# Patient Record
Sex: FEMALE | Race: WHITE | Employment: OTHER | URBAN - NONMETROPOLITAN AREA
[De-identification: names, ages, dates, MRNs, and addresses within clinical notes are randomized per-mention and may not be internally consistent; named-entity substitution may affect disease eponyms.]

---

## 2019-02-26 PROBLEM — M54.50 CHRONIC BILATERAL LOW BACK PAIN WITHOUT SCIATICA: Status: ACTIVE | Noted: 2019-02-26

## 2019-02-26 PROBLEM — G89.29 CHRONIC BILATERAL LOW BACK PAIN WITHOUT SCIATICA: Status: ACTIVE | Noted: 2019-02-26

## 2019-02-26 PROBLEM — M47.819 FACET HYPERTROPHY: Status: ACTIVE | Noted: 2019-02-26

## 2021-06-22 ENCOUNTER — TELEPHONE (OUTPATIENT)
Dept: PAIN MANAGEMENT | Age: 86
End: 2021-06-22

## 2021-12-20 ENCOUNTER — TELEPHONE (OUTPATIENT)
Dept: PAIN MANAGEMENT | Age: 86
End: 2021-12-20

## 2022-02-07 PROBLEM — M53.3 SACROILIAC JOINT DYSFUNCTION OF RIGHT SIDE: Status: ACTIVE | Noted: 2022-02-07

## 2022-08-23 PROBLEM — M54.2 CERVICALGIA: Status: ACTIVE | Noted: 2022-08-23

## 2023-04-26 ENCOUNTER — HOSPITAL ENCOUNTER (OUTPATIENT)
Dept: PAIN MANAGEMENT | Age: 88
Discharge: HOME | End: 2023-04-26
Payer: MEDICARE

## 2023-04-26 VITALS
DIASTOLIC BLOOD PRESSURE: 83 MMHG | WEIGHT: 142.04 LBS | OXYGEN SATURATION: 93 % | RESPIRATION RATE: 18 BRPM | SYSTOLIC BLOOD PRESSURE: 131 MMHG | TEMPERATURE: 97.1 F | HEART RATE: 54 BPM | BODY MASS INDEX: 24.25 KG/M2 | HEIGHT: 64 IN

## 2023-04-26 DIAGNOSIS — M47.816 FACET HYPERTROPHY OF LUMBAR REGION: ICD-10-CM

## 2023-04-26 PROCEDURE — 99213 OFFICE O/P EST LOW 20 MIN: CPT

## 2023-04-26 PROCEDURE — 99213 OFFICE O/P EST LOW 20 MIN: CPT | Performed by: NURSE PRACTITIONER

## 2023-04-26 RX ORDER — HYDROCODONE BITARTRATE AND ACETAMINOPHEN 7.5; 325 MG/1; MG/1
1 TABLET ORAL EVERY 6 HOURS PRN
Qty: 120 TABLET | Refills: 0 | Status: SHIPPED | OUTPATIENT
Start: 2023-04-26 | End: 2023-05-26

## 2023-04-26 ASSESSMENT — PAIN - FUNCTIONAL ASSESSMENT: PAIN_FUNCTIONAL_ASSESSMENT: PREVENTS OR INTERFERES SOME ACTIVE ACTIVITIES AND ADLS

## 2023-04-26 ASSESSMENT — PAIN DESCRIPTION - ORIENTATION: ORIENTATION: LOWER

## 2023-04-26 ASSESSMENT — PAIN SCALES - GENERAL: PAINLEVEL_OUTOF10: 4

## 2023-04-26 ASSESSMENT — PAIN DESCRIPTION - PROGRESSION: CLINICAL_PROGRESSION: GRADUALLY IMPROVING

## 2023-04-26 ASSESSMENT — PAIN DESCRIPTION - PAIN TYPE: TYPE: CHRONIC PAIN

## 2023-04-26 ASSESSMENT — PAIN DESCRIPTION - LOCATION: LOCATION: BACK

## 2023-04-26 ASSESSMENT — PAIN DESCRIPTION - DESCRIPTORS: DESCRIPTORS: ACHING;SHARP

## 2023-04-26 NOTE — PROGRESS NOTES
Clinic Documentation      Education Provided:  [x] Review of Ron Risk  [] Agreement Review  [x] PEG Score Calculated [] PHQ Score Calculated [] ORT Score Calculated    [] Compliance Issues Discussed [] Cognitive Behavior Needs [x] Exercise [] Review of Test [] Financial Issues  [x] Tobacco/Alcohol Use Reviewed [x] Teaching [] New Patient [] Picture Obtained    Physician Plan:  [] Outgoing Referral  [] Pharmacy Consult  [] Test Ordered [x] Prescription Ordered/Changed   [] Obtained Test Results / Consult Notes        Complete if patient is withholding blood thinner for procedure     Blood Thinner Patient is currently taking:      [] Plavix (Hold for 7 days)  [x] Aspirin (Hold for 5 days)     [] Pletal (Hold for 2 days)  [] Pradaxa (Hold for 3 days)    [] Effient (Hold for 7 days)  [] Xarelto (Hold for 2 days)    [] Eliquis (Hold for 2 days)  [] Brilinta (Hold for 7 days)    [] Coumadin (Hold for 5 days) - (INR needs to be drawn the day prior to procedure- INR < 2.0)    [] Aggrenox (Hold for 7 days)        [] Patient will stop medication on their own.    [] Blood Thinner Form Faxed for approval to hold.    Provider form faxed to:     Assessment Completed by:  Electronically signed by Darya Mack RN on 4/26/2023 at 1:44 PM

## 2023-04-26 NOTE — PROGRESS NOTES
[facility] Physical and Pain Medicine    Office Progress Note    Patient Name: Justin Mendoza    MR #: [id number]    Account #: [de-identified]    : 1935    Age: 81 y.o. Sex: female    Date: 2023    PCP: Luma Iniguez         Referring Provider:    Chief Complaint:   Chief Complaint   Patient presents with    Back Pain       History of Present Illness:    Justin Mendoza is a 81 y.o. female who presents to the office for follow-up of bilateral lumbar facet injections levels L3-L4, L4-L5. She says that she obtained 80% relief for 6 weeks. She is wanting the injections repeated. She continues to take her Norco and does obtain some relief between injections which allows her to complete ADL's. She holds her ASA on her own. Knows to hold 5 days prior to procedure. Last pain management HPI note read    Employment: Retired []   Disabled  []   Works []    Does Not Work [x]     Previous Injury:  Yes  []   No [x]     Previous Surgery: Yes []   No [x]     Previous Physical Therapy In the last 6 months? Yes  []     No [x]   Did Physical Therapy make thepain better or worse? Better []   Worse []  Unchanged []    MRI in the last two years? Yes [x]  No []   Results reviewed with patient? Yes []   No []    CT Scan in the last two years? Yes  []   No [x]   Results reviewed with patient? Yes []   No []    X-ray in the last two years? Yes [x]   No  []  Results reviewed with patient? Yes  []  No []    Injections in the past?  Yes [x]   No []   Did the injections help relieve the pain? Yes [x]   No []     Do you have Depression? Yes  []    No [x]   Thinking of harming yourself or others?   Yes  []   No [x]     Past Medical Histoy  Past Medical History:   Diagnosis Date    Cancer ([address]     uterine    Chronic back pain     Depression     GERD (gastroesophageal reflux disease)     History of peptic ulcer     Meniere disease     Mitral valve prolapse        Surgery History  Past Surgical

## 2023-05-03 ENCOUNTER — TELEPHONE (OUTPATIENT)
Dept: PAIN MANAGEMENT | Age: 88
End: 2023-05-03

## 2023-05-03 NOTE — TELEPHONE ENCOUNTER
Call placed to patient to remind her to hold asa x5 days starting tomorrow for lumbar facets next Tuesday. Voicemail left for patient.

## 2023-05-09 ENCOUNTER — HOSPITAL ENCOUNTER (OUTPATIENT)
Dept: PAIN MANAGEMENT | Age: 88
Discharge: HOME | End: 2023-05-09
Payer: MEDICARE

## 2023-05-09 VITALS
RESPIRATION RATE: 20 BRPM | DIASTOLIC BLOOD PRESSURE: 70 MMHG | TEMPERATURE: 97.1 F | SYSTOLIC BLOOD PRESSURE: 155 MMHG | HEART RATE: 52 BPM | OXYGEN SATURATION: 95 %

## 2023-05-09 DIAGNOSIS — R52 PAIN MANAGEMENT: ICD-10-CM

## 2023-05-09 PROCEDURE — 64493 INJ PARAVERT F JNT L/S 1 LEV: CPT

## 2023-05-09 PROCEDURE — 6360000002 HC RX W HCPCS

## 2023-05-09 PROCEDURE — 2500000003 HC RX 250 WO HCPCS

## 2023-05-09 PROCEDURE — 64494 INJ PARAVERT F JNT L/S 2 LEV: CPT

## 2023-05-09 RX ORDER — METHYLPREDNISOLONE ACETATE 80 MG/ML
80 INJECTION, SUSPENSION INTRA-ARTICULAR; INTRALESIONAL; INTRAMUSCULAR; SOFT TISSUE ONCE
Status: DISCONTINUED | OUTPATIENT
Start: 2023-05-09 | End: 2023-05-11 | Stop reason: HOSPADM

## 2023-05-09 RX ORDER — BUPIVACAINE HYDROCHLORIDE 5 MG/ML
5 INJECTION, SOLUTION EPIDURAL; INTRACAUDAL ONCE
Status: DISCONTINUED | OUTPATIENT
Start: 2023-05-09 | End: 2023-05-11 | Stop reason: HOSPADM

## 2023-05-09 RX ORDER — LIDOCAINE HYDROCHLORIDE 10 MG/ML
20 INJECTION, SOLUTION EPIDURAL; INFILTRATION; INTRACAUDAL; PERINEURAL ONCE
Status: DISCONTINUED | OUTPATIENT
Start: 2023-05-09 | End: 2023-05-11 | Stop reason: HOSPADM

## 2023-05-09 ASSESSMENT — PAIN DESCRIPTION - FREQUENCY: FREQUENCY: INTERMITTENT

## 2023-05-09 ASSESSMENT — PAIN DESCRIPTION - PAIN TYPE: TYPE: CHRONIC PAIN

## 2023-05-09 ASSESSMENT — PAIN SCALES - GENERAL: PAINLEVEL_OUTOF10: 7

## 2023-05-09 ASSESSMENT — PAIN - FUNCTIONAL ASSESSMENT
PAIN_FUNCTIONAL_ASSESSMENT: PREVENTS OR INTERFERES SOME ACTIVE ACTIVITIES AND ADLS
PAIN_FUNCTIONAL_ASSESSMENT: PREVENTS OR INTERFERES SOME ACTIVE ACTIVITIES AND ADLS
PAIN_FUNCTIONAL_ASSESSMENT: NONE - DENIES PAIN

## 2023-05-09 ASSESSMENT — PAIN DESCRIPTION - LOCATION: LOCATION: BACK

## 2023-05-09 ASSESSMENT — PAIN DESCRIPTION - ORIENTATION: ORIENTATION: LOWER

## 2023-05-09 ASSESSMENT — PAIN DESCRIPTION - DESCRIPTORS
DESCRIPTORS: ACHING;THROBBING;SHARP
DESCRIPTORS: ACHING;SHARP;THROBBING

## 2023-05-09 NOTE — INTERVAL H&P NOTE
Update History & Physical    The patient's History and Physical was reviewed with the patient and I examined the patient. There was no change. The surgical site was confirmed by the patient and me. Plan: The risks, benefits, expected outcome, and alternative to the recommended procedure have been discussed with the patient. Patient understands and wants to proceed with the procedure.      Electronically signed by Jamila Rosado MD on 5/9/2023 at 9:54 AM

## 2023-05-16 ENCOUNTER — TELEPHONE (OUTPATIENT)
Dept: PAIN MANAGEMENT | Age: 88
End: 2023-05-16

## 2023-06-07 DIAGNOSIS — M47.816 FACET HYPERTROPHY OF LUMBAR REGION: ICD-10-CM

## 2023-06-07 RX ORDER — HYDROCODONE BITARTRATE AND ACETAMINOPHEN 7.5; 325 MG/1; MG/1
1 TABLET ORAL EVERY 6 HOURS PRN
Qty: 120 TABLET | Refills: 0 | Status: SHIPPED | OUTPATIENT
Start: 2023-06-07 | End: 2023-07-07

## 2023-07-26 ENCOUNTER — HOSPITAL ENCOUNTER (OUTPATIENT)
Dept: PAIN MANAGEMENT | Age: 88
Discharge: HOME | End: 2023-07-26
Payer: MEDICARE

## 2023-07-26 VITALS
RESPIRATION RATE: 18 BRPM | DIASTOLIC BLOOD PRESSURE: 77 MMHG | HEART RATE: 53 BPM | BODY MASS INDEX: 24.92 KG/M2 | SYSTOLIC BLOOD PRESSURE: 145 MMHG | WEIGHT: 146 LBS | TEMPERATURE: 96.8 F | HEIGHT: 64 IN

## 2023-07-26 DIAGNOSIS — M47.816 FACET HYPERTROPHY OF LUMBAR REGION: ICD-10-CM

## 2023-07-26 PROCEDURE — 99213 OFFICE O/P EST LOW 20 MIN: CPT

## 2023-07-26 PROCEDURE — 99213 OFFICE O/P EST LOW 20 MIN: CPT | Performed by: NURSE PRACTITIONER

## 2023-07-26 RX ORDER — HYDROCODONE BITARTRATE AND ACETAMINOPHEN 7.5; 325 MG/1; MG/1
1 TABLET ORAL EVERY 6 HOURS PRN
Qty: 120 TABLET | Refills: 0 | Status: SHIPPED | OUTPATIENT
Start: 2023-07-26 | End: 2023-08-25

## 2023-07-26 ASSESSMENT — PAIN SCALES - GENERAL: PAINLEVEL_OUTOF10: 3

## 2023-07-26 ASSESSMENT — PAIN DESCRIPTION - FREQUENCY: FREQUENCY: INTERMITTENT

## 2023-07-26 ASSESSMENT — PAIN DESCRIPTION - ONSET: ONSET: ON-GOING

## 2023-07-26 ASSESSMENT — PAIN - FUNCTIONAL ASSESSMENT: PAIN_FUNCTIONAL_ASSESSMENT: PREVENTS OR INTERFERES SOME ACTIVE ACTIVITIES AND ADLS

## 2023-07-26 ASSESSMENT — PAIN DESCRIPTION - PAIN TYPE: TYPE: CHRONIC PAIN

## 2023-07-26 ASSESSMENT — PAIN DESCRIPTION - ORIENTATION: ORIENTATION: RIGHT;LEFT

## 2023-07-26 ASSESSMENT — PAIN DESCRIPTION - LOCATION: LOCATION: BACK;HIP

## 2023-07-26 ASSESSMENT — PAIN DESCRIPTION - DESCRIPTORS: DESCRIPTORS: NAGGING

## 2023-07-26 NOTE — PROGRESS NOTES
Clinic Documentation      Education Provided:  [x] Review of Ollie Danielle  [] Agreement Review  [x] PEG Score Calculated [] PHQ Score Calculated [] ORT Score Calculated    [] Compliance Issues Discussed [] Cognitive Behavior Needs [x] Exercise [] Review of Test [] Financial Issues  [x] Tobacco/Alcohol Use Reviewed [x] Teaching [] New Patient [] Picture Obtained    Physician Plan:  [] Outgoing Referral  [] Pharmacy Consult  [] Test Ordered [x] Prescription Ordered/Changed   [] Obtained Test Results / Consult Notes        Complete if patient is withholding blood thinner for procedure     Blood Thinner Patient is currently taking:      [] Plavix (Hold for 7 days)  [] Aspirin (Hold for 5 days)     [] Pletal (Hold for 2 days)  [] Pradaxa (Hold for 3 days)    [] Effient (Hold for 7 days)  [] Xarelto (Hold for 2 days)    [] Eliquis (Hold for 2 days)  [] Brilinta (Hold for 7 days)    [] Coumadin (Hold for 5 days) - (INR needs to be drawn the day prior to procedure- INR < 2.0)    [] Aggrenox (Hold for 7 days)        [] Patient will stop medication on their own.    [] Blood Thinner Form Faxed for approval to hold.    Provider form faxed to:     Assessment Completed by:  Electronically signed by Maryjane Tavera RN on 7/26/2023 at 11:17 AM
PLAN:  1. Patient is to call the office with any questions or concerns that may arise prior to next appointment. 2. Continue Norco   3. Follow up in 2 months    Patient will need Bilateral lumbar facets scheduled L3-L4. L4-L5 if heart ok    Patient knows to hold ASA 5 days prior to procedure per self    Urine Drug Screen Current/Today:  Yes  [x]  No []     Discussion:  Discussed exam findings and plan of care with patient. Patient agreed with the current plan of care at this time. All questions from the patient were answered by the provider. Activity:   Discussed exercise as beneficial to pain reduction, encouraged stretching exercise with a focus on torso strengthening. Education Provided:  Review of Sergo Enloe [x] Agreement Review [x]  Reviewed PHQ-9  [x]    Review of Test [] Compliance Issues Discussed []   Cognitive Behavior Needs [] Exercise [x]  Financial Issues []   Tobacco/Alcohol Use [] Teaching  [x]     Goal:  Pain Management Goals of Therapy:   []        Resolution in pain  [x]        Decrease in pain level  [x]        Improvement in ADL's  [x]        Increase in activities with less pain  [x]        Decrease in medication      [] Benzodiazapine's and Narcotics:  Patient educated on the possible effects of combining Benzodiazapine's and Opioids. Explained \"Black Box Warnings\" such as; possible suppressed breathing, hypoxia, anoxia, depressed cognition, heart arrhythmia, coma and possible death. Patient verbalized understanding concerning possible effects. Controlled Substance Monitoring:  Attestation: The [facility] report for this patient was reviewed today. Discussed with patient possible medication side effects, risk of tolerance, dependence and alternative treatments. Discussed the growing epidemic in the [place] with the overprescribing and at times the abuse of narcotics. Discussed the detrimental effects of long term narcotic use.  Patient encouraged to set daily goals of

## 2023-08-02 DIAGNOSIS — M47.816 FACET HYPERTROPHY OF LUMBAR REGION: ICD-10-CM

## 2023-08-02 RX ORDER — HYDROCODONE BITARTRATE AND ACETAMINOPHEN 7.5; 325 MG/1; MG/1
1 TABLET ORAL EVERY 6 HOURS PRN
Qty: 120 TABLET | Refills: 0 | Status: SHIPPED | OUTPATIENT
Start: 2023-08-02 | End: 2023-09-01

## 2023-10-09 ENCOUNTER — HOSPITAL ENCOUNTER (OUTPATIENT)
Dept: PAIN MANAGEMENT | Age: 88
Discharge: HOME | End: 2023-10-09
Payer: MEDICARE

## 2023-10-09 VITALS
DIASTOLIC BLOOD PRESSURE: 87 MMHG | WEIGHT: 144 LBS | TEMPERATURE: 97.9 F | SYSTOLIC BLOOD PRESSURE: 145 MMHG | BODY MASS INDEX: 24.59 KG/M2 | RESPIRATION RATE: 18 BRPM | HEART RATE: 68 BPM | OXYGEN SATURATION: 96 % | HEIGHT: 64 IN

## 2023-10-09 DIAGNOSIS — M47.816 FACET HYPERTROPHY OF LUMBAR REGION: ICD-10-CM

## 2023-10-09 PROCEDURE — 99213 OFFICE O/P EST LOW 20 MIN: CPT | Performed by: NURSE PRACTITIONER

## 2023-10-09 PROCEDURE — 99213 OFFICE O/P EST LOW 20 MIN: CPT

## 2023-10-09 RX ORDER — HYDROCODONE BITARTRATE AND ACETAMINOPHEN 7.5; 325 MG/1; MG/1
1 TABLET ORAL EVERY 6 HOURS PRN
Qty: 120 TABLET | Refills: 0 | Status: SHIPPED | OUTPATIENT
Start: 2023-10-09 | End: 2023-10-11

## 2023-10-09 ASSESSMENT — PAIN - FUNCTIONAL ASSESSMENT: PAIN_FUNCTIONAL_ASSESSMENT: PREVENTS OR INTERFERES WITH MANY ACTIVE NOT PASSIVE ACTIVITIES

## 2023-10-09 ASSESSMENT — PAIN DESCRIPTION - LOCATION: LOCATION: BACK

## 2023-10-09 ASSESSMENT — PAIN DESCRIPTION - FREQUENCY: FREQUENCY: INTERMITTENT

## 2023-10-09 ASSESSMENT — PAIN DESCRIPTION - ONSET: ONSET: ON-GOING

## 2023-10-09 ASSESSMENT — PAIN SCALES - GENERAL: PAINLEVEL_OUTOF10: 8

## 2023-10-09 ASSESSMENT — PAIN DESCRIPTION - PAIN TYPE: TYPE: CHRONIC PAIN

## 2023-10-09 ASSESSMENT — PAIN DESCRIPTION - DESCRIPTORS: DESCRIPTORS: NAGGING

## 2023-10-09 NOTE — PROGRESS NOTES
Clinic Documentation      Education Provided:  [x] Review of Andrew Co  [] Agreement Review  [x] PEG Score Calculated [] PHQ Score Calculated [] ORT Score Calculated    [] Compliance Issues Discussed [] Cognitive Behavior Needs [x] Exercise [] Review of Test [] Financial Issues  [x] Tobacco/Alcohol Use Reviewed [x] Teaching [] New Patient [] Picture Obtained    Physician Plan:  [] Outgoing Referral  [] Pharmacy Consult  [] Test Ordered [x] Prescription Ordered/Changed   [] Obtained Test Results / Consult Notes        Complete if patient is withholding blood thinner for procedure     Blood Thinner Patient is currently taking:      [] Plavix (Hold for 7 days)  [] Aspirin (Hold for 5 days)     [] Pletal (Hold for 2 days)  [] Pradaxa (Hold for 3 days)    [] Effient (Hold for 7 days)  [] Xarelto (Hold for 2 days)    [] Eliquis (Hold for 2 days)  [] Brilinta (Hold for 7 days)    [] Coumadin (Hold for 5 days) - (INR needs to be drawn the day prior to procedure- INR < 2.0)    [] Aggrenox (Hold for 7 days)        [] Patient will stop medication on their own.    [] Blood Thinner Form Faxed for approval to hold.    Provider form faxed to:     Assessment Completed by:  Electronically signed by Bertram Lewis MA on 10/9/2023 at 2:04 PM

## 2023-10-09 NOTE — PROGRESS NOTES
[facility] Physical and Pain Medicine    Office Progress Note    Patient Name: Zara Malone    MR #: [id number]    Account #: [de-identified]    : 1935    Age: 81 y.o. Sex: female    Date: 2023    PCP: Garrick Emery         Referring Provider:    Chief Complaint:   Chief Complaint   Patient presents with    Follow-up     2mo       History of Present Illness:    Zara Malone is a 81 y.o. female who presents to the office for follow-up of low back pain. She is wanting to get lumbar facet injections levels L3-L4, L4-L5 again. She says that her pain in her low back has gotten worse. She has not had the injections since May. She obtains 85% relief for 6 weeks with the injections. Says that she can increase her exercise to 3 times a week after the injections which further helps to reduce her pain. She occasionally takes her Norco which seems to help with her pain along with the injections. Last pain management HPI note read    Employment: Retired []   Disabled  []   Works []    Does Not Work [x]     Previous Injury:  Yes  []   No [x]     Previous Surgery: Yes []   No [x]     Previous Physical Therapy In the last 6 months? Yes  []     No [x]   Did Physical Therapy make thepain better or worse? Better []   Worse []  Unchanged []    MRI in the last two years? Yes [x]  No []   Results reviewed with patient? Yes []   No []    CT Scan in the last two years? Yes  []   No [x]   Results reviewed with patient? Yes []   No []    X-ray in the last two years? Yes [x]   No  []  Results reviewed with patient? Yes  []  No []    Injections in the past?  Yes [x]   No []   Did the injections help relieve the pain? Yes [x]   No []     Do you have Depression? Yes  []    No [x]   Thinking of harming yourself or others?   Yes  []   No [x]     Past Medical Histoy  Past Medical History:   Diagnosis Date    Cancer ([address])     uterine    Chronic back pain     Depression yes

## 2023-10-09 NOTE — PROGRESS NOTES
Pt reports she has missed several Pain Mgmt appts in the last year due to the unexpected death of 3 siblings and a 1year old great grandchild.

## 2023-10-10 DIAGNOSIS — M47.816 FACET HYPERTROPHY OF LUMBAR REGION: ICD-10-CM

## 2023-10-11 RX ORDER — HYDROCODONE BITARTRATE AND ACETAMINOPHEN 7.5; 325 MG/1; MG/1
1 TABLET ORAL EVERY 6 HOURS PRN
Qty: 120 TABLET | Refills: 0 | Status: SHIPPED | OUTPATIENT
Start: 2023-10-11 | End: 2023-11-10

## 2023-10-25 ENCOUNTER — TELEPHONE (OUTPATIENT)
Dept: PAIN MANAGEMENT | Age: 88
End: 2023-10-25

## 2023-10-25 NOTE — TELEPHONE ENCOUNTER
Call placed to patient to inform her that lumbar facets have not been approved and the procedure has been cancelled for now. Left message for patient with her  and he states understanding.

## 2023-11-30 ENCOUNTER — HOSPITAL ENCOUNTER (OUTPATIENT)
Dept: PAIN MANAGEMENT | Age: 88
Discharge: HOME | End: 2023-11-30
Payer: MEDICARE

## 2023-11-30 VITALS
DIASTOLIC BLOOD PRESSURE: 87 MMHG | TEMPERATURE: 97.2 F | HEART RATE: 62 BPM | OXYGEN SATURATION: 98 % | SYSTOLIC BLOOD PRESSURE: 165 MMHG | RESPIRATION RATE: 18 BRPM

## 2023-11-30 DIAGNOSIS — M47.819 FACET HYPERTROPHY: Primary | ICD-10-CM

## 2023-11-30 DIAGNOSIS — M47.816 FACET HYPERTROPHY OF LUMBAR REGION: ICD-10-CM

## 2023-11-30 PROCEDURE — 99213 OFFICE O/P EST LOW 20 MIN: CPT

## 2023-11-30 RX ORDER — ASCORBIC ACID 500 MG
500 TABLET ORAL DAILY
COMMUNITY

## 2023-11-30 ASSESSMENT — ENCOUNTER SYMPTOMS
BOWEL INCONTINENCE: 0
GASTROINTESTINAL NEGATIVE: 1
EYES NEGATIVE: 1
BACK PAIN: 1
RESPIRATORY NEGATIVE: 1

## 2023-11-30 NOTE — TELEPHONE ENCOUNTER
1. Facet hypertrophy of lumbar region      Requested Prescriptions     Pending Prescriptions Disp Refills    HYDROcodone-acetaminophen (NORCO) 7.5-325 MG per tablet 120 tablet 0     Sig: Take 1 tablet by mouth every 6 hours as needed for Pain (exempt) for up to 30 days.        Continue medication with refill sent at appointment yes; refill sent to medical director at appointment yes, see refill encounter dated 11/30/2023    Electronically signed by HSEYLA Larry CNP on 11/30/2023 at 1:30 PM

## 2023-11-30 NOTE — PROGRESS NOTES
Clinic Documentation      Education Provided:  [x] Review of Sergo Spearman  [] Agreement Review  [x] PEG Score Calculated [] PHQ Score Calculated [] ORT Score Calculated    [] Compliance Issues Discussed [] Cognitive Behavior Needs [x] Exercise [] Review of Test [] Financial Issues  [x] Tobacco/Alcohol Use Reviewed [x] Teaching [] New Patient [] Picture Obtained    Physician Plan:  [] Outgoing Referral  [] Pharmacy Consult  [] Test Ordered [x] Prescription Ordered/Changed   [] Obtained Test Results / Consult Notes        Complete if patient is withholding blood thinner for procedure     Blood Thinner Patient is currently taking:      [] Plavix (Hold for 7 days)  [x] Aspirin (Hold for 5 days)     [] Pletal (Hold for 2 days)  [] Pradaxa (Hold for 3 days)    [] Effient (Hold for 7 days)  [] Xarelto (Hold for 2 days)    [] Eliquis (Hold for 2 days)  [] Brilinta (Hold for 7 days)    [] Coumadin (Hold for 5 days) - (INR needs to be drawn the day prior to procedure- INR < 2.0)    [] Aggrenox (Hold for 7 days)        [x] Patient will stop medication on their own.    [] Blood Thinner Form Faxed for approval to hold.    Provider form faxed to:     Assessment Completed by:  Electronically signed by Quentin Hsieh RN on 11/30/2023 at 1:00 PM
media   [] Medication regimen effective with no c/o side effects and continued   [] New patient continuing current medication while developing POC   [x] On going assessment and evaluation of medication regimen  [] Medication regimen not effective see plan for changes  [x] Annette Lesser reviewed & on file under media     CC:  MD Luci Alvarado, APRN - CNP, 11/30/2023 at 1:51 PM    EMR dragon/transcription disclaimer: Much of this encounter note is electronic transcription/translation of spoken language to printed tach. Electronic translation of spoken language may be erroneous, or at times, nonsensical words or phrases may be inadvertently transcribed.  Although, I have reviewed the note for such errors, some may still exist.
Detail Level: Detailed
Detail Level: Zone
Detail Level: Generalized
Sunscreen Recommendations: broad spectrum sunscreen SPF 30 or greater daily, reapply at least every 2 hours

## 2023-12-01 RX ORDER — HYDROCODONE BITARTRATE AND ACETAMINOPHEN 7.5; 325 MG/1; MG/1
1 TABLET ORAL EVERY 6 HOURS PRN
Qty: 120 TABLET | Refills: 0 | Status: SHIPPED | OUTPATIENT
Start: 2023-12-01 | End: 2023-12-31

## 2024-01-03 ENCOUNTER — TELEPHONE (OUTPATIENT)
Dept: PAIN MANAGEMENT | Age: 89
End: 2024-01-03

## 2024-01-03 NOTE — TELEPHONE ENCOUNTER
Call placed to patient to inform her that her lumbar facets have not been approved yet and we need to reschedule the appointment. Patient states understanding and appointment changed to 1/16/24 at 1330.

## 2024-01-10 ENCOUNTER — TELEPHONE (OUTPATIENT)
Dept: PAIN MANAGEMENT | Age: 89
End: 2024-01-10

## 2024-01-10 NOTE — TELEPHONE ENCOUNTER
Call placed to patient to inform her that her lumbar facets have not been approved for next Tuesday. Appointment rescheduled for 1/30/24 1330. Patient states understanding.

## 2024-02-26 ENCOUNTER — HOSPITAL ENCOUNTER (OUTPATIENT)
Dept: PAIN MANAGEMENT | Age: 89
Discharge: HOME | End: 2024-02-26
Payer: MEDICARE

## 2024-02-26 ENCOUNTER — HOSPITAL ENCOUNTER (OUTPATIENT)
Dept: GENERAL RADIOLOGY | Age: 89
Discharge: HOME | End: 2024-02-26
Payer: MEDICARE

## 2024-02-26 VITALS
SYSTOLIC BLOOD PRESSURE: 131 MMHG | DIASTOLIC BLOOD PRESSURE: 71 MMHG | WEIGHT: 138 LBS | TEMPERATURE: 96.5 F | HEIGHT: 64 IN | HEART RATE: 58 BPM | RESPIRATION RATE: 16 BRPM | BODY MASS INDEX: 23.56 KG/M2 | OXYGEN SATURATION: 96 %

## 2024-02-26 DIAGNOSIS — M47.819 FACET HYPERTROPHY: ICD-10-CM

## 2024-02-26 DIAGNOSIS — Z51.81 ENCOUNTER FOR MONITORING OPIOID MAINTENANCE THERAPY: ICD-10-CM

## 2024-02-26 DIAGNOSIS — M47.816 FACET HYPERTROPHY OF LUMBAR REGION: ICD-10-CM

## 2024-02-26 DIAGNOSIS — Z79.891 ENCOUNTER FOR MONITORING OPIOID MAINTENANCE THERAPY: ICD-10-CM

## 2024-02-26 DIAGNOSIS — M47.816 LUMBAR FACET ARTHROPATHY: Primary | ICD-10-CM

## 2024-02-26 DIAGNOSIS — M47.816 LUMBAR FACET ARTHROPATHY: ICD-10-CM

## 2024-02-26 DIAGNOSIS — M79.18 MYOFASCIAL MUSCLE PAIN: ICD-10-CM

## 2024-02-26 PROCEDURE — 99214 OFFICE O/P EST MOD 30 MIN: CPT

## 2024-02-26 PROCEDURE — 72100 X-RAY EXAM L-S SPINE 2/3 VWS: CPT

## 2024-02-26 RX ORDER — HYDROCODONE BITARTRATE AND ACETAMINOPHEN 7.5; 325 MG/1; MG/1
1 TABLET ORAL EVERY 6 HOURS PRN
Qty: 120 TABLET | Refills: 0 | Status: SHIPPED | OUTPATIENT
Start: 2024-02-26 | End: 2024-03-27

## 2024-02-26 ASSESSMENT — ENCOUNTER SYMPTOMS
RESPIRATORY NEGATIVE: 1
CONSTIPATION: 0
GASTROINTESTINAL NEGATIVE: 1
EYES NEGATIVE: 1
BACK PAIN: 1
BOWEL INCONTINENCE: 0

## 2024-02-26 ASSESSMENT — PAIN SCALES - GENERAL: PAINLEVEL_OUTOF10: 3

## 2024-02-26 ASSESSMENT — PAIN DESCRIPTION - LOCATION: LOCATION: BACK

## 2024-02-26 ASSESSMENT — PAIN DESCRIPTION - PAIN TYPE: TYPE: CHRONIC PAIN

## 2024-02-26 NOTE — PROGRESS NOTES
Clinic Documentation      Education Provided:  [x] Review of Sami  [] Agreement Review  [x] PEG Score Calculated [] PHQ Score Calculated [] ORT Score Calculated    [] Compliance Issues Discussed [] Cognitive Behavior Needs [x] Exercise [] Review of Test [] Financial Issues  [x] Tobacco/Alcohol Use Reviewed [x] Teaching [] New Patient [] Picture Obtained    Physician Plan:  [] Outgoing Referral  [] Pharmacy Consult  [x] Test Ordered [x] Prescription Ordered/Changed   [] Obtained Test Results / Consult Notes        Complete if patient is withholding blood thinner for procedure     Blood Thinner Patient is currently taking:      [] Plavix (Hold for 7 days)  [] Aspirin (Hold for 5 days)     [] Pletal (Hold for 2 days)  [] Pradaxa (Hold for 3 days)    [] Effient (Hold for 7 days)  [] Xarelto (Hold for 2 days)    [] Eliquis (Hold for 2 days)  [] Brilinta (Hold for 7 days)    [] Coumadin (Hold for 5 days) - (INR needs to be drawn the day prior to procedure- INR < 2.0)    [] Aggrenox (Hold for 7 days)        [] Patient will stop medication on their own.    [] Blood Thinner Form Faxed for approval to hold.   Provider form faxed to:     Assessment Completed by:  Electronically signed by Danika Dumont MA on 2/26/2024 at 12:48 PM

## 2024-02-26 NOTE — PROGRESS NOTES
OFFICE NOTE    Patient Name: Ashley Gutierrez    MR #: [id number]    Account #:[id number]    : 1935    Age: 88 y.o.    Sex: female    Date: 2024    PCP: Grover Wright MD      Chief Complaint:   Chief Complaint   Patient presents with    Back Pain       History of Present Illness:   The patient is a 88 y.o. female who presents to the office for a 3 month follow up. Patient insurance denied facet injections. Patient has had bilateral lumbar facet injections L3-4, L4-5 under fluoroscopy with Dr. Johnston on 2023. Patient had at least 80-85% relief of pain from procedure(s) for at least 12 weeks. After injection, patient was able to increase activity. Patient had less pain from aggravating factors. Patient was able to decrease pain medication usage. Patient received enough pain relief from injections that the patient would like to repeat the injection(s).     Patient has back pain started problem is longstanding, Caused by no known injury, Quality: aching, sharp, dull, stabbing, knife-like, and throbbing, Behavior:  constant, Worsens with: flexion, extension, and standing, Improves with: nothing    Patient rates pain 6    Patient has pain that radiates down from the low back to the without radiation   Patient has more pain getting up from a seated position than the act of setting down. Yes   Patient has 100% of pain in the back and 0% in the leg(s).   Patient leans on shopping cart or leans forward when sitting in a chair to relieve low back pain. Yes   Patient has to sleep in a recliner or in an inclined position due to pain. Yes   Pain worsens going up and downstairs. Yes Patient has to take one step at a time due to pain. Yes   Patient is a side sleeper. No  Patient tosses and turns at night due to pain. Yes Patient sleeps with a pillow between his/her knees to help lessen pain. No Patient has pain that radiates into the groin. No    Patient has to use assistive devices due to pain Yes. Patient

## 2024-02-26 NOTE — TELEPHONE ENCOUNTER
1. Facet hypertrophy of lumbar region    2. Facet hypertrophy      Requested Prescriptions     Pending Prescriptions Disp Refills    HYDROcodone-acetaminophen (NORCO) 7.5-325 MG per tablet 120 tablet 0     Sig: Take 1 tablet by mouth every 6 hours as needed for Pain (exempt) for up to 30 days.    diclofenac (VOLTAREN) 50 MG EC tablet 60 tablet 2     Sig: Take 1 tablet by mouth 2 times daily (with meals)       Continue medication with refill sent at appointment yes; refill sent to medical director at appointment no, see refill encounter dated 2/26/2024    Electronically signed by SHEYLA Mahoney CNP on 2/26/2024 at 1:23 PM

## 2024-02-27 PROBLEM — Z51.81 ENCOUNTER FOR MONITORING OPIOID MAINTENANCE THERAPY: Status: ACTIVE | Noted: 2024-02-27

## 2024-02-27 PROBLEM — Z79.891 ENCOUNTER FOR MONITORING OPIOID MAINTENANCE THERAPY: Status: ACTIVE | Noted: 2024-02-27

## 2024-02-28 ENCOUNTER — TELEPHONE (OUTPATIENT)
Dept: PAIN MANAGEMENT | Age: 89
End: 2024-02-28

## 2024-02-28 NOTE — TELEPHONE ENCOUNTER
Call to patient to review results of her lumbar spine xray.  I let her know per Ciera, that there is anteriolisthesis seen on the xray, and that she can proceed with the facet injections, or a consult can be sent to neurosurgery.  Explained to her that her vertebrae have slipped forward.  Patient stated that unless it would be an extreme reason, she does not want to have back surgery.  She wishes to proceed with the facet injections at this time, and then will follow up with provider to talk more about the xrays.

## 2024-03-06 ENCOUNTER — TELEPHONE (OUTPATIENT)
Dept: PAIN MANAGEMENT | Age: 89
End: 2024-03-06

## 2024-03-06 NOTE — TELEPHONE ENCOUNTER
Call placed to patient to remind her to hold asa x5 days starting tomorrow for lumbar facets next Tuesday. Patient states understanding.

## 2024-03-12 ENCOUNTER — HOSPITAL ENCOUNTER (OUTPATIENT)
Dept: PAIN MANAGEMENT | Age: 89
Discharge: HOME | End: 2024-03-12
Payer: MEDICARE

## 2024-03-12 VITALS
OXYGEN SATURATION: 97 % | TEMPERATURE: 96.8 F | SYSTOLIC BLOOD PRESSURE: 159 MMHG | DIASTOLIC BLOOD PRESSURE: 89 MMHG | HEART RATE: 54 BPM | RESPIRATION RATE: 18 BRPM

## 2024-03-12 DIAGNOSIS — R52 PAIN MANAGEMENT: ICD-10-CM

## 2024-03-12 PROCEDURE — 2500000003 HC RX 250 WO HCPCS

## 2024-03-12 PROCEDURE — 64494 INJ PARAVERT F JNT L/S 2 LEV: CPT

## 2024-03-12 PROCEDURE — 6360000002 HC RX W HCPCS

## 2024-03-12 PROCEDURE — 64493 INJ PARAVERT F JNT L/S 1 LEV: CPT

## 2024-03-12 RX ORDER — LIDOCAINE HYDROCHLORIDE 10 MG/ML
20 INJECTION, SOLUTION EPIDURAL; INFILTRATION; INTRACAUDAL; PERINEURAL ONCE
Status: DISCONTINUED | OUTPATIENT
Start: 2024-03-12 | End: 2024-03-14 | Stop reason: HOSPADM

## 2024-03-12 RX ORDER — METHYLPREDNISOLONE ACETATE 80 MG/ML
80 INJECTION, SUSPENSION INTRA-ARTICULAR; INTRALESIONAL; INTRAMUSCULAR; SOFT TISSUE ONCE
Status: DISCONTINUED | OUTPATIENT
Start: 2024-03-12 | End: 2024-03-14 | Stop reason: HOSPADM

## 2024-03-12 RX ORDER — BUPIVACAINE HYDROCHLORIDE 5 MG/ML
5 INJECTION, SOLUTION EPIDURAL; INTRACAUDAL ONCE
Status: DISCONTINUED | OUTPATIENT
Start: 2024-03-12 | End: 2024-03-14 | Stop reason: HOSPADM

## 2024-03-12 ASSESSMENT — PAIN - FUNCTIONAL ASSESSMENT: PAIN_FUNCTIONAL_ASSESSMENT: 0-10

## 2024-03-12 NOTE — INTERVAL H&P NOTE
Update History & Physical    The patient's History and Physical   was reviewed with the patient and I examined the patient. There was no change. The surgical site was confirmed by the patient and me.     Plan: The risks, benefits, expected outcome, and alternative to the recommended procedure have been discussed with the patient. Patient understands and wants to proceed with the procedure.     Electronically signed by Renaldo Johnston MD on 3/12/2024 at 1:21 PM

## 2024-05-28 ENCOUNTER — HOSPITAL ENCOUNTER (OUTPATIENT)
Dept: PAIN MANAGEMENT | Age: 89
Discharge: HOME | End: 2024-05-28
Payer: MEDICARE

## 2024-05-28 VITALS
HEIGHT: 64 IN | SYSTOLIC BLOOD PRESSURE: 132 MMHG | RESPIRATION RATE: 16 BRPM | OXYGEN SATURATION: 96 % | TEMPERATURE: 95.9 F | DIASTOLIC BLOOD PRESSURE: 67 MMHG | BODY MASS INDEX: 23.56 KG/M2 | WEIGHT: 138 LBS | HEART RATE: 48 BPM

## 2024-05-28 DIAGNOSIS — M47.816 FACET HYPERTROPHY OF LUMBAR REGION: ICD-10-CM

## 2024-05-28 DIAGNOSIS — M47.816 LUMBAR FACET ARTHROPATHY: ICD-10-CM

## 2024-05-28 DIAGNOSIS — M47.819 FACET HYPERTROPHY: ICD-10-CM

## 2024-05-28 DIAGNOSIS — Z02.89 PAIN MANAGEMENT CONTRACT AGREEMENT: ICD-10-CM

## 2024-05-28 DIAGNOSIS — M54.50 CHRONIC BILATERAL LOW BACK PAIN WITHOUT SCIATICA: Primary | ICD-10-CM

## 2024-05-28 DIAGNOSIS — G89.29 CHRONIC BILATERAL LOW BACK PAIN WITHOUT SCIATICA: Primary | ICD-10-CM

## 2024-05-28 PROCEDURE — 99214 OFFICE O/P EST MOD 30 MIN: CPT

## 2024-05-28 RX ORDER — HYDROCODONE BITARTRATE AND ACETAMINOPHEN 7.5; 325 MG/1; MG/1
1 TABLET ORAL EVERY 6 HOURS PRN
Qty: 120 TABLET | Refills: 0 | Status: SHIPPED | OUTPATIENT
Start: 2024-05-28 | End: 2024-06-27

## 2024-05-28 RX ORDER — NITROGLYCERIN 0.4 MG/1
TABLET SUBLINGUAL
COMMUNITY

## 2024-05-28 ASSESSMENT — ENCOUNTER SYMPTOMS
GASTROINTESTINAL NEGATIVE: 1
CONSTIPATION: 0
BOWEL INCONTINENCE: 0
EYES NEGATIVE: 1
RESPIRATORY NEGATIVE: 1

## 2024-05-28 ASSESSMENT — PAIN SCALES - GENERAL: PAINLEVEL_OUTOF10: 5

## 2024-05-28 ASSESSMENT — PAIN DESCRIPTION - LOCATION: LOCATION: BACK

## 2024-05-28 ASSESSMENT — PAIN DESCRIPTION - PAIN TYPE: TYPE: CHRONIC PAIN

## 2024-05-28 NOTE — TELEPHONE ENCOUNTER
1. Facet hypertrophy of lumbar region    2. Facet hypertrophy      Requested Prescriptions     Pending Prescriptions Disp Refills    HYDROcodone-acetaminophen (NORCO) 7.5-325 MG per tablet 120 tablet 0     Sig: Take 1 tablet by mouth every 6 hours as needed for Pain (exempt) for up to 30 days.    diclofenac (VOLTAREN) 50 MG EC tablet 60 tablet 2     Sig: Take 1 tablet by mouth 2 times daily (with meals)       Continue medication with refill sent at appointment yes; refill sent to medical director at appointment no, see refill encounter dated 5/28/2024    Electronically signed by SHEYLA Mahoney CNP on 5/28/2024 at 10:42 AM

## 2024-05-28 NOTE — H&P
History & Physical    Patient Name: Ashley Gutierrez    MR #: [id number]    Account #:[id number]    : 1935    Age: 88 y.o.    Sex: female    Date: 2024    PCP: Grover Wright MD      Chief Complaint:   Chief Complaint   Patient presents with    Back Pain     5/10       History of Present Illness:   The patient is a 88 y.o. female who presents to the office for a follow up procedure and annual exam for chronic low back pain.     The patient had Bilateral Lumbar Facet Injections L3-4, L4-5 under Fluoroscopy by Dr Johnston on 2024. Patient had at least 80-85% relief of pain from procedure(s) for at least 8 weeks. After injection, patient was able to increase activity. Patient had less pain from aggravating factors such as  physical activity, position, lifting, and flexion/extension, transportation, housework, and walking . Patient was able to decrease pain medication usage. Patient received enough pain relief from injections that the patient would like to repeat the injection(s).     Oswestry Disability Index (SUSHIL)  Pain Intensity: E. The pain is very severe at the moment  Personal Care (Washing,Dressing, etc.): D. I need some help, but manage most of my personal care  Lifting: E. I can lift very light weights  Walking: D. Pain prevents me from walking more than 100 yards  Sitting: D. Pain prevents me from sitting more than 1/2 hour  Standing: E. Pain prevents me from standing more than 10 minutes  Sleeping: D. Because of pain I have less than 4 hours sleep  Sex Life (if applicable) : F. Pain prevents any sex life at all  Social Life : D. Pain has restricted my social life and I do not go out as often  Traveling : F. Pain prevents me from traveling except to receive treatment  Owsestry Disability Total Scores: 37  Numbers of Completion: 10  Oswestry Disability Scores %: 74     Current Total Disability Percentage: 74 % (Date: 2024)    Interpretation of Score: Higher score indicates symptoms  Tarsorrhaphy Performed?: No

## 2024-05-28 NOTE — PROGRESS NOTES
Clinic Documentation      Education Provided:  [x] Review of Sami  [x] Agreement Review  [x] PEG Score Calculated [x] PHQ Score Calculated [x] ORT Score Calculated    [] Compliance Issues Discussed [] Cognitive Behavior Needs [x] Exercise [] Review of Test [] Financial Issues  [x] Tobacco/Alcohol Use Reviewed [x] Teaching [] New Patient [] Picture Obtained    Physician Plan:  [] Outgoing Referral  [] Pharmacy Consult  [] Test Ordered [x] Prescription Ordered/Changed   [] Obtained Test Results / Consult Notes        Complete if patient is withholding blood thinner for procedure     Blood Thinner Patient is currently taking:      [] Plavix (Hold for 7 days)  [] Aspirin (Hold for 5 days)     [] Pletal (Hold for 2 days)  [] Pradaxa (Hold for 3 days)    [] Effient (Hold for 7 days)  [] Xarelto (Hold for 2 days)    [] Eliquis (Hold for 2 days)  [] Brilinta (Hold for 7 days)    [] Coumadin (Hold for 5 days) - (INR needs to be drawn the day prior to procedure- INR < 2.0)    [] Aggrenox (Hold for 7 days)        [] Patient will stop medication on their own.    [] Blood Thinner Form Faxed for approval to hold.   Provider form faxed to:     Assessment Completed by:  Electronically signed by Angelica Cesar MA on 5/28/2024 at 10:49 AM

## 2024-05-29 PROBLEM — Z02.89 PAIN MANAGEMENT CONTRACT AGREEMENT: Status: ACTIVE | Noted: 2024-05-29

## 2024-05-29 ASSESSMENT — ENCOUNTER SYMPTOMS: BACK PAIN: 1

## 2024-07-03 ENCOUNTER — TELEPHONE (OUTPATIENT)
Dept: PAIN MANAGEMENT | Age: 89
End: 2024-07-03

## 2024-07-03 NOTE — TELEPHONE ENCOUNTER
Call placed to patient to offer her a sooner office visit.  Patient procedure was denied due to needing more documentation.  Scheduled patient to come for office visit on 7/8/24 at 2:30. Explained process to patient and 7/9/24 procedure appointment canceled at this time.  Patient will attend office visit this Monday.

## 2024-07-08 ENCOUNTER — HOSPITAL ENCOUNTER (OUTPATIENT)
Dept: PAIN MANAGEMENT | Age: 89
Discharge: HOME | End: 2024-07-08
Payer: MEDICARE

## 2024-07-08 VITALS
TEMPERATURE: 97.1 F | OXYGEN SATURATION: 96 % | DIASTOLIC BLOOD PRESSURE: 81 MMHG | HEART RATE: 52 BPM | RESPIRATION RATE: 16 BRPM | BODY MASS INDEX: 23.65 KG/M2 | WEIGHT: 137.8 LBS | SYSTOLIC BLOOD PRESSURE: 141 MMHG

## 2024-07-08 DIAGNOSIS — M47.816 FACET HYPERTROPHY OF LUMBAR REGION: ICD-10-CM

## 2024-07-08 DIAGNOSIS — G89.29 CHRONIC BILATERAL LOW BACK PAIN WITHOUT SCIATICA: Primary | ICD-10-CM

## 2024-07-08 DIAGNOSIS — M54.50 CHRONIC BILATERAL LOW BACK PAIN WITHOUT SCIATICA: Primary | ICD-10-CM

## 2024-07-08 DIAGNOSIS — M47.819 FACET HYPERTROPHY: ICD-10-CM

## 2024-07-08 DIAGNOSIS — M47.816 LUMBAR FACET ARTHROPATHY: ICD-10-CM

## 2024-07-08 PROCEDURE — 99213 OFFICE O/P EST LOW 20 MIN: CPT

## 2024-07-08 PROCEDURE — 99214 OFFICE O/P EST MOD 30 MIN: CPT

## 2024-07-08 RX ORDER — HYDROCODONE BITARTRATE AND ACETAMINOPHEN 7.5; 325 MG/1; MG/1
1 TABLET ORAL EVERY 6 HOURS PRN
Qty: 120 TABLET | Refills: 0 | Status: SHIPPED | OUTPATIENT
Start: 2024-07-08 | End: 2024-08-07

## 2024-07-08 ASSESSMENT — ENCOUNTER SYMPTOMS
EYES NEGATIVE: 1
BACK PAIN: 1
CONSTIPATION: 0
BOWEL INCONTINENCE: 0
GASTROINTESTINAL NEGATIVE: 1
RESPIRATORY NEGATIVE: 1

## 2024-07-08 ASSESSMENT — PAIN DESCRIPTION - PROGRESSION: CLINICAL_PROGRESSION: GRADUALLY IMPROVING

## 2024-07-08 ASSESSMENT — PAIN DESCRIPTION - PAIN TYPE: TYPE: CHRONIC PAIN

## 2024-07-08 ASSESSMENT — PAIN DESCRIPTION - LOCATION: LOCATION: BACK

## 2024-07-08 ASSESSMENT — PAIN DESCRIPTION - ONSET: ONSET: ON-GOING

## 2024-07-08 ASSESSMENT — PAIN DESCRIPTION - FREQUENCY: FREQUENCY: INTERMITTENT

## 2024-07-08 ASSESSMENT — PAIN - FUNCTIONAL ASSESSMENT: PAIN_FUNCTIONAL_ASSESSMENT: PREVENTS OR INTERFERES WITH MANY ACTIVE NOT PASSIVE ACTIVITIES

## 2024-07-08 ASSESSMENT — PAIN DESCRIPTION - ORIENTATION: ORIENTATION: LEFT;RIGHT

## 2024-07-08 ASSESSMENT — PAIN DESCRIPTION - DESCRIPTORS: DESCRIPTORS: NAGGING

## 2024-07-08 ASSESSMENT — PAIN SCALES - GENERAL: PAINLEVEL_OUTOF10: 8

## 2024-07-08 NOTE — TELEPHONE ENCOUNTER
1. Facet hypertrophy of lumbar region    2. Facet hypertrophy      Requested Prescriptions     Pending Prescriptions Disp Refills    HYDROcodone-acetaminophen (NORCO) 7.5-325 MG per tablet 120 tablet 0     Sig: Take 1 tablet by mouth every 6 hours as needed for Pain (exempt) for up to 30 days.    diclofenac (VOLTAREN) 50 MG EC tablet 60 tablet 2     Sig: Take 1 tablet by mouth 2 times daily (with meals)       Continue medication with refill sent at appointment yes; refill sent to medical director at appointment no, see refill encounter dated 7/8/2024    Electronically signed by SHEYLA Mahoney CNP on 7/8/2024 at 1:45 PM

## 2024-07-08 NOTE — PROGRESS NOTES
Clinic Documentation      Education Provided:  [x] Review of Sami  [] Agreement Review  [x] PEG Score Calculated [] PHQ Score Calculated [] ORT Score Calculated    [] Compliance Issues Discussed [] Cognitive Behavior Needs [x] Exercise [] Review of Test [] Financial Issues  [x] Tobacco/Alcohol Use Reviewed [x] Teaching [] New Patient [] Picture Obtained    Physician Plan:  [] Outgoing Referral  [] Pharmacy Consult  [] Test Ordered [x] Prescription Ordered/Changed   [] Obtained Test Results / Consult Notes        Complete if patient is withholding blood thinner for procedure     Blood Thinner Patient is currently taking:      [] Plavix (Hold for 7 days)  [] Aspirin (Hold for 5 days)     [] Pletal (Hold for 2 days)  [] Pradaxa (Hold for 3 days)    [] Effient (Hold for 7 days)  [] Xarelto (Hold for 2 days)    [] Eliquis (Hold for 2 days)  [] Brilinta (Hold for 7 days)    [] Coumadin (Hold for 5 days) - (INR needs to be drawn the day prior to procedure- INR < 2.0)    [] Aggrenox (Hold for 7 days)        [] Patient will stop medication on their own.    [] Blood Thinner Form Faxed for approval to hold.   Provider form faxed to:     Assessment Completed by:  Electronically signed by Angelica Cesar MA on 7/8/2024 at 1:58 PM  
treatment  Owsestry Disability Total Scores: 37  Numbers of Completion: 10  Oswestry Disability Scores %: 74     Current Total Disability Percentage: 74 % (Date: 2024)    Interpretation of Score: Higher score indicates symptoms are impacting life and patient is more disabled from back pain.       Patient continues the home exercise program currently and has for greater than 6 weeks at least 3 times weekly or as tolerated.     Patient continues Norco for pain as needed    Back Pain  This is a chronic problem. The current episode started more than 1 year ago. The problem occurs constantly. The problem has been gradually worsening since onset. The pain is present in the lumbar spine. The quality of the pain is described as aching and cramping. The pain does not radiate. The pain is at a severity of 8/10. The pain is moderate. The pain is The same all the time. The symptoms are aggravated by bending, twisting and standing. Stiffness is present In the morning. Pertinent negatives include no bladder incontinence, bowel incontinence, leg pain, numbness or weakness. Risk factors include obesity. She has tried heat, home exercises, ice and NSAIDs for the symptoms. The treatment provided mild relief.       Does pain affect ADLs Yes transportation, housework, and walking  Does pain affect quality of life? Yes  Does pain affect activities of enjoyment? Yes  Have you been on pain medication for your pain? Yes  If so, does the pain medication keep your pain tolerable to perform the tasks that affect your pain? Yes     Current PE.3    Past PE.7    ORT Score: 0    PHQ-9 Score: 0    Current Pain Assessment  Pain Assessment  Pain Assessment: 0-10  Pain Level: 8  Pain Location: Back  Pain Orientation: Left, Right  Pain Descriptors: Nagging  Functional Pain Assessment: Prevents or interferes with many active not passive activities  Pain Type: Chronic pain  Pain Radiating Towards: Back improved from injections but hip have

## 2024-07-31 ENCOUNTER — TELEPHONE (OUTPATIENT)
Dept: PAIN MANAGEMENT | Age: 89
End: 2024-07-31

## 2024-07-31 NOTE — TELEPHONE ENCOUNTER
Attempted to call patient on home number, cell phone number , and 's cell phone number several times today and have been unable to contact patient concerning her procedure appointment for next Tuesday. The procedure has not been approved yet by patient's insurance and the appointment has been rescheduled to 8/27/24 at 1530. New appointment information mailed to patient.

## 2024-10-15 ENCOUNTER — HOSPITAL ENCOUNTER (OUTPATIENT)
Dept: PAIN MANAGEMENT | Age: 89
Discharge: HOME | End: 2024-10-15
Payer: MEDICARE

## 2024-10-15 VITALS
WEIGHT: 142 LBS | SYSTOLIC BLOOD PRESSURE: 138 MMHG | OXYGEN SATURATION: 96 % | RESPIRATION RATE: 16 BRPM | HEART RATE: 54 BPM | DIASTOLIC BLOOD PRESSURE: 76 MMHG | BODY MASS INDEX: 24.24 KG/M2 | TEMPERATURE: 96.1 F | HEIGHT: 64 IN

## 2024-10-15 DIAGNOSIS — G89.29 CHRONIC BILATERAL LOW BACK PAIN WITHOUT SCIATICA: Primary | ICD-10-CM

## 2024-10-15 DIAGNOSIS — M54.16 LUMBAR RADICULOPATHY: ICD-10-CM

## 2024-10-15 DIAGNOSIS — M47.819 FACET HYPERTROPHY: ICD-10-CM

## 2024-10-15 DIAGNOSIS — M54.50 CHRONIC BILATERAL LOW BACK PAIN WITHOUT SCIATICA: Primary | ICD-10-CM

## 2024-10-15 DIAGNOSIS — M47.816 FACET HYPERTROPHY OF LUMBAR REGION: ICD-10-CM

## 2024-10-15 PROCEDURE — 99213 OFFICE O/P EST LOW 20 MIN: CPT

## 2024-10-15 PROCEDURE — 99214 OFFICE O/P EST MOD 30 MIN: CPT

## 2024-10-15 RX ORDER — HYDROCODONE BITARTRATE AND ACETAMINOPHEN 7.5; 325 MG/1; MG/1
1 TABLET ORAL EVERY 6 HOURS PRN
Qty: 120 TABLET | Refills: 0 | Status: SHIPPED | OUTPATIENT
Start: 2024-10-15 | End: 2024-11-14

## 2024-10-15 ASSESSMENT — PAIN DESCRIPTION - LOCATION: LOCATION: BACK

## 2024-10-15 ASSESSMENT — ENCOUNTER SYMPTOMS
GASTROINTESTINAL NEGATIVE: 1
CONSTIPATION: 0
RESPIRATORY NEGATIVE: 1
EYES NEGATIVE: 1
BOWEL INCONTINENCE: 0
BACK PAIN: 1

## 2024-10-15 ASSESSMENT — PAIN DESCRIPTION - PAIN TYPE: TYPE: CHRONIC PAIN

## 2024-10-15 ASSESSMENT — PAIN SCALES - GENERAL: PAINLEVEL_OUTOF10: 8

## 2024-10-15 ASSESSMENT — PAIN DESCRIPTION - ORIENTATION: ORIENTATION: LOWER

## 2024-10-15 NOTE — TELEPHONE ENCOUNTER
1. Facet hypertrophy of lumbar region    2. Facet hypertrophy      Requested Prescriptions     Pending Prescriptions Disp Refills    HYDROcodone-acetaminophen (NORCO) 7.5-325 MG per tablet 120 tablet 0     Sig: Take 1 tablet by mouth every 6 hours as needed for Pain (exempt) for up to 30 days.    diclofenac (VOLTAREN) 50 MG EC tablet 60 tablet 2     Sig: Take 1 tablet by mouth 2 times daily (with meals)       Continue medication with refill sent at appointment yes; refill sent to medical director at appointment no, see refill encounter dated 10/15/2024    Electronically signed by SHEYLA Mahoney CNP on 10/15/2024 at 3:02 PM

## 2024-10-15 NOTE — PROGRESS NOTES
Office Note    Patient Name: Ashley Gutierrez    MR #: [id number]    Account #:[id number]    : 1935    Age: 88 y.o.    Sex: female    Date: 10/15/2024    PCP: Grover Wright MD      Chief Complaint:   Chief Complaint   Patient presents with    Back Pain     8/10       History of Present Illness:   The patient is a 88 y.o. female who presents to the office for a routine visit for management of chronic back pain.     Patient was schedule for Bilateral Facet Injections but insurance denied procedure. Since last visit, patient had xray lumbar spine that showed facet joint arthropathy. Degenerative changes with primary convexity to the right at the L2-L3 level and 9 mm degenerative anterolisthesis of L4 on L5.    Patient continues to have severe low back pain. Patient reports since last visit, she has noticed radicular pain down bilateral lower extremities following the L4-5 dermatone. Lt worse than Rt. Since last visit, patient has been to the ED twice due to pain.     Does pain affect ADLs Yes The following ADL's increases pain or patient is unable to do because of pain. transportation, shopping, preparing meals, laundry, housework, dressing, bathing, hygiene, and walking    Patient has made adjusts to ADL's such as sitting to do dishes, sitting to get dressed, using a shower chair for bathing, taking frequent rest periods when doing activities that affect pain.  Yes    Does pain affect quality of life? Yes    Does pain affect activities of enjoyment? Yes    Have you been on pain medication for your pain? Yes    If so, does the pain medication keep your pain tolerable to perform the tasks that affect your pain? Yes      Patient has continued HEP - home exercise program which consists of exercises at least 3 times a week or as tolerated in addition patient is encouraged to stretch twice daily (upon awaking & prior to bed).      Back Pain  This is a chronic problem. The current episode started more than 1 year

## 2024-10-15 NOTE — PROGRESS NOTES
Clinic Documentation      Education Provided:  [x] Review of Sami  [] Agreement Review  [x] PEG Score Calculated [] PHQ Score Calculated [] ORT Score Calculated    [] Compliance Issues Discussed [] Cognitive Behavior Needs [x] Exercise [] Review of Test [] Financial Issues  [x] Tobacco/Alcohol Use Reviewed [x] Teaching [] New Patient [] Picture Obtained    Physician Plan:  [] Outgoing Referral  [] Pharmacy Consult  [] Test Ordered [x] Prescription Ordered/Changed   [] Obtained Test Results / Consult Notes        Complete if patient is withholding blood thinner for procedure     Blood Thinner Patient is currently taking:      [] Plavix (Hold for 7 days)  [x] Aspirin (Hold for 5 days)     [] Pletal (Hold for 2 days)  [] Pradaxa (Hold for 3 days)    [] Effient (Hold for 7 days)  [] Xarelto (Hold for 2 days)    [] Eliquis (Hold for 2 days)  [] Brilinta (Hold for 7 days)    [] Coumadin (Hold for 5 days) - (INR needs to be drawn the day prior to procedure- INR < 2.0)    [] Aggrenox (Hold for 7 days)        [x] Patient will stop medication on their own.    [] Blood Thinner Form Faxed for approval to hold.   Provider form faxed to:     Assessment Completed by:  Electronically signed by Janelle Atkins RN on 10/15/2024 at 3:07 PM

## 2024-10-30 ENCOUNTER — TELEPHONE (OUTPATIENT)
Dept: PAIN MANAGEMENT | Age: 89
End: 2024-10-30

## 2024-10-30 NOTE — TELEPHONE ENCOUNTER
Call placed to patient to remind her to hold asa x5 days starting tomorrow for LESI next Tuesday. Patient states understanding.

## 2024-11-05 ENCOUNTER — HOSPITAL ENCOUNTER (OUTPATIENT)
Dept: PAIN MANAGEMENT | Age: 89
Discharge: HOME | End: 2024-11-05
Payer: MEDICARE

## 2024-11-05 VITALS
HEART RATE: 50 BPM | OXYGEN SATURATION: 98 % | RESPIRATION RATE: 16 BRPM | TEMPERATURE: 96.9 F | DIASTOLIC BLOOD PRESSURE: 80 MMHG | SYSTOLIC BLOOD PRESSURE: 149 MMHG

## 2024-11-05 DIAGNOSIS — R52 PAIN MANAGEMENT: ICD-10-CM

## 2024-11-05 PROCEDURE — 62323 NJX INTERLAMINAR LMBR/SAC: CPT

## 2024-11-05 PROCEDURE — 6360000002 HC RX W HCPCS

## 2024-11-05 PROCEDURE — 2500000003 HC RX 250 WO HCPCS

## 2024-11-05 PROCEDURE — 2580000003 HC RX 258

## 2024-11-05 RX ORDER — LIDOCAINE HYDROCHLORIDE 10 MG/ML
5 INJECTION, SOLUTION EPIDURAL; INFILTRATION; INTRACAUDAL; PERINEURAL ONCE
Status: DISCONTINUED | OUTPATIENT
Start: 2024-11-05 | End: 2024-11-07 | Stop reason: HOSPADM

## 2024-11-05 RX ORDER — TRIAMCINOLONE ACETONIDE 40 MG/ML
40 INJECTION, SUSPENSION INTRA-ARTICULAR; INTRAMUSCULAR ONCE
Status: DISCONTINUED | OUTPATIENT
Start: 2024-11-05 | End: 2024-11-07 | Stop reason: HOSPADM

## 2024-11-05 RX ORDER — SODIUM CHLORIDE 9 MG/ML
5 INJECTION, SOLUTION INTRAMUSCULAR; INTRAVENOUS; SUBCUTANEOUS ONCE
Status: DISCONTINUED | OUTPATIENT
Start: 2024-11-05 | End: 2024-11-07 | Stop reason: HOSPADM

## 2024-11-05 ASSESSMENT — PAIN - FUNCTIONAL ASSESSMENT: PAIN_FUNCTIONAL_ASSESSMENT: 0-10

## 2024-11-05 NOTE — INTERVAL H&P NOTE
Update History & Physical    The patient's History and Physical  was reviewed with the patient and I examined the patient. There was no change. The surgical site was confirmed by the patient and me.     Plan: The risks, benefits, expected outcome, and alternative to the recommended procedure have been discussed with the patient. Patient understands and wants to proceed with the procedure.     Electronically signed by Renaldo Johnston MD on 11/5/2024 at 1:47 PM

## 2024-11-05 NOTE — PROGRESS NOTES
Procedure:  Level of Consciousness: [x]Alert [x]Oriented []Disoriented []Lethargic  Anxiety Level: [x]Calm []Anxious []Depressed []Other  Skin: []Warm [x]Dry []Cool []Moist []Intact []Other  Cardiovascular: [x]Palpitations: [x]Never []Occasionally []Frequently  Chest Pain: []No []Yes  Respiratory:  [x]Unlabored []Labored []Cough ([] Productive []Unproductive)  HCG Required: [x]No []Yes   Results: []Negative []Positive  Knowledge Level:        [x]Patient/Other verbalized understanding of pre-procedure instructions.        [x]Assessment of post-op care needs (transportation, responsible caregiver)        [x]Able to discuss health care problems and how to deal with it.  Factors that Affect Teaching:        Language Barrier: [x]No []Yes - why:        Hearing Loss:        [x]No []Yes            Corrective Device:  []Yes [x]No        Vision Loss:           []No [x]Yes            Corrective Device:  [x]Yes []No        Memory Loss:       [x]No []Yes            []Short Term []Long Term  Motivational Level:  [x]Asks Questions                  []Extremely Anxious       [x]Seems Interested               []Seems Uninterested                  []Denies need for Education  Risk for Injury:  [x]Patient oriented to person, place and time  []History of frequent falls/loss of balance  Nutritional:  []Change in appetite   []Weight Gain   []Weight Loss  Functional:  []Requires assistance with ADL's

## 2024-12-02 DIAGNOSIS — M47.816 FACET HYPERTROPHY OF LUMBAR REGION: ICD-10-CM

## 2024-12-02 RX ORDER — HYDROCODONE BITARTRATE AND ACETAMINOPHEN 7.5; 325 MG/1; MG/1
1 TABLET ORAL EVERY 6 HOURS PRN
Qty: 120 TABLET | Refills: 0 | Status: SHIPPED | OUTPATIENT
Start: 2024-12-02 | End: 2025-01-01

## 2024-12-02 NOTE — TELEPHONE ENCOUNTER
Last seen in office visit: 10/15/24  Next scheduled office visit: 02/19/25  Last UDS results: 02/26/24  Any discrepancies on Sami (such as refills from another provider): no

## 2025-01-27 DIAGNOSIS — M47.819 FACET HYPERTROPHY: ICD-10-CM

## 2025-01-27 DIAGNOSIS — M47.816 FACET HYPERTROPHY OF LUMBAR REGION: ICD-10-CM

## 2025-01-27 RX ORDER — HYDROCODONE BITARTRATE AND ACETAMINOPHEN 7.5; 325 MG/1; MG/1
1 TABLET ORAL EVERY 6 HOURS PRN
Qty: 120 TABLET | Refills: 0 | Status: SHIPPED | OUTPATIENT
Start: 2025-01-27 | End: 2025-02-26

## 2025-01-27 NOTE — TELEPHONE ENCOUNTER
Last seen in office visit: 10/15/24  Next scheduled office visit: 2/19/25 with Ciera  Last UDS results: compliment  Any discrepancies on Sami (such as refills from another provider): none

## 2025-02-26 ENCOUNTER — HOSPITAL ENCOUNTER (OUTPATIENT)
Dept: PAIN MANAGEMENT | Age: 89
Discharge: HOME | End: 2025-02-26
Payer: MEDICARE

## 2025-02-26 VITALS
HEART RATE: 50 BPM | RESPIRATION RATE: 16 BRPM | DIASTOLIC BLOOD PRESSURE: 72 MMHG | BODY MASS INDEX: 23.86 KG/M2 | OXYGEN SATURATION: 94 % | SYSTOLIC BLOOD PRESSURE: 135 MMHG | TEMPERATURE: 95.9 F | WEIGHT: 143.2 LBS | HEIGHT: 65 IN

## 2025-02-26 DIAGNOSIS — M79.18 MYOFASCIAL MUSCLE PAIN: ICD-10-CM

## 2025-02-26 DIAGNOSIS — F11.90 CHRONIC, CONTINUOUS USE OF OPIOIDS: ICD-10-CM

## 2025-02-26 DIAGNOSIS — Z79.891 ENCOUNTER FOR MONITORING OPIOID MAINTENANCE THERAPY: ICD-10-CM

## 2025-02-26 DIAGNOSIS — M47.816 LUMBAR FACET ARTHROPATHY: ICD-10-CM

## 2025-02-26 DIAGNOSIS — Z51.81 ENCOUNTER FOR MONITORING OPIOID MAINTENANCE THERAPY: ICD-10-CM

## 2025-02-26 DIAGNOSIS — M54.50 CHRONIC BILATERAL LOW BACK PAIN WITHOUT SCIATICA: Primary | ICD-10-CM

## 2025-02-26 DIAGNOSIS — G89.29 CHRONIC BILATERAL LOW BACK PAIN WITHOUT SCIATICA: Primary | ICD-10-CM

## 2025-02-26 PROCEDURE — 99214 OFFICE O/P EST MOD 30 MIN: CPT

## 2025-02-26 ASSESSMENT — ENCOUNTER SYMPTOMS
BOWEL INCONTINENCE: 0
RESPIRATORY NEGATIVE: 1
GASTROINTESTINAL NEGATIVE: 1
EYES NEGATIVE: 1
BACK PAIN: 1

## 2025-02-26 ASSESSMENT — PAIN DESCRIPTION - ORIENTATION: ORIENTATION: LOWER

## 2025-02-26 ASSESSMENT — PAIN SCALES - GENERAL: PAINLEVEL_OUTOF10: 8

## 2025-02-26 ASSESSMENT — PAIN DESCRIPTION - LOCATION: LOCATION: BACK

## 2025-02-26 ASSESSMENT — PAIN DESCRIPTION - PAIN TYPE: TYPE: CHRONIC PAIN

## 2025-02-26 NOTE — PROGRESS NOTES
Clinic Documentation      Education Provided:  [x] Review of Sami  [] Agreement Review  [x] PEG Score Calculated [] PHQ Score Calculated [] ORT Score Calculated    [] Compliance Issues Discussed [] Cognitive Behavior Needs [x] Exercise [] Review of Test [] Financial Issues  [x] Tobacco/Alcohol Use Reviewed [x] Teaching [] New Patient [] Picture Obtained    Physician Plan:  [] Outgoing Referral  [] Pharmacy Consult  [x] Test Ordered [x] Prescription Ordered/Changed   [] Obtained Test Results / Consult Notes        Complete if patient is withholding blood thinner for procedure     Blood Thinner Patient is currently taking:      [] Plavix (Hold for 7 days)  [] Aspirin (Hold for 5 days)     [] Pletal (Hold for 2 days)  [] Pradaxa (Hold for 3 days)    [] Effient (Hold for 7 days)  [] Xarelto (Hold for 2 days)    [] Eliquis (Hold for 2 days)  [] Brilinta (Hold for 7 days)    [] Coumadin (Hold for 5 days) - (INR needs to be drawn the day prior to procedure- INR < 2.0)    [] Aggrenox (Hold for 7 days)        [] Patient will stop medication on their own.    [] Blood Thinner Form Faxed for approval to hold.   Provider form faxed to:   Assessment Completed by:  Electronically signed by NELLIE GOMEZ on 2/26/2025 at 2:02 PM

## 2025-02-26 NOTE — PROGRESS NOTES
[facility] Physical & Pain Medicine  [address] [address]  Phone: [phone]  Fax: [phone]        Follow Up Office Visit    Patient Name: Ashley Gutierrez    MR #: [id number]    Account #:[id number]    : 1935    Age: 89 y.o.    Sex: female    Date: 25    PCP: Grover Wright MD    Chief Complaint:   Chief Complaint   Patient presents with    Back Pain     8/10         History of Present Illness:   The patient is a 89 y.o. female who presents for follow up on primary complaints of chronic low back pain. Patient has been an established patient since 2019. She previously received lumbar facet injections with good relief. Insurance stopped covering those. Last office visit she was scheduled for LESI due to worsened pain. Patient reports minimal improvement with the epidural injection. Almost feels as though she was worse afterwards. States that she has been miserable since she has not had facet injections and she would like to discuss an RFA for long term relief.     Patient is currently managed on diclofenac and norco. Patient takes it as prescribed. With pain medication patient is able to tolerate activities longer than without. Patient reports side effects of constipation and drowsiness from their opioid therapy. Patient reports this therapy is not effective.    Back Pain  This is a chronic problem. The current episode started more than 1 year ago. The problem occurs constantly. The problem has been gradually worsening since onset. The pain is present in the lumbar spine. The quality of the pain is described as aching, burning, shooting and stabbing. The pain radiates to the right thigh, left knee, left thigh and right knee. The pain is at a severity of 8/10. The pain is severe. The pain is The same all the time. The symptoms are aggravated by bending, position, sitting and standing. Stiffness is present All day. Associated symptoms include leg pain. Pertinent

## 2025-03-10 DIAGNOSIS — M47.816 FACET HYPERTROPHY OF LUMBAR REGION: ICD-10-CM

## 2025-03-10 RX ORDER — HYDROCODONE BITARTRATE AND ACETAMINOPHEN 7.5; 325 MG/1; MG/1
1 TABLET ORAL EVERY 6 HOURS PRN
Qty: 120 TABLET | Refills: 0 | Status: SHIPPED | OUTPATIENT
Start: 2025-03-10 | End: 2025-04-09

## 2025-03-26 ENCOUNTER — HOSPITAL ENCOUNTER (OUTPATIENT)
Dept: PAIN MANAGEMENT | Age: 89
Discharge: HOME | End: 2025-03-26
Payer: MEDICARE

## 2025-03-26 ENCOUNTER — HOSPITAL ENCOUNTER (OUTPATIENT)
Dept: PAIN MANAGEMENT | Age: 89
Discharge: HOME | End: 2025-03-26

## 2025-03-26 VITALS
OXYGEN SATURATION: 95 % | TEMPERATURE: 96.7 F | RESPIRATION RATE: 16 BRPM | SYSTOLIC BLOOD PRESSURE: 146 MMHG | HEART RATE: 55 BPM | DIASTOLIC BLOOD PRESSURE: 66 MMHG

## 2025-03-26 VITALS
RESPIRATION RATE: 20 BRPM | HEART RATE: 115 BPM | DIASTOLIC BLOOD PRESSURE: 61 MMHG | OXYGEN SATURATION: 98 % | SYSTOLIC BLOOD PRESSURE: 154 MMHG

## 2025-03-26 DIAGNOSIS — R52 PAIN MANAGEMENT: ICD-10-CM

## 2025-03-26 PROCEDURE — 64636 DESTROY L/S FACET JNT ADDL: CPT

## 2025-03-26 PROCEDURE — 64494 INJ PARAVERT F JNT L/S 2 LEV: CPT

## 2025-03-26 PROCEDURE — 99152 MOD SED SAME PHYS/QHP 5/>YRS: CPT

## 2025-03-26 PROCEDURE — 6360000002 HC RX W HCPCS

## 2025-03-26 PROCEDURE — 64494 INJ PARAVERT F JNT L/S 2 LEV: CPT | Performed by: STUDENT IN AN ORGANIZED HEALTH CARE EDUCATION/TRAINING PROGRAM

## 2025-03-26 PROCEDURE — 6370000000 HC RX 637 (ALT 250 FOR IP)

## 2025-03-26 PROCEDURE — 64635 DESTROY LUMB/SAC FACET JNT: CPT

## 2025-03-26 PROCEDURE — 64493 INJ PARAVERT F JNT L/S 1 LEV: CPT | Performed by: STUDENT IN AN ORGANIZED HEALTH CARE EDUCATION/TRAINING PROGRAM

## 2025-03-26 PROCEDURE — 64493 INJ PARAVERT F JNT L/S 1 LEV: CPT

## 2025-03-26 RX ORDER — DIAZEPAM 2 MG/1
2 TABLET ORAL EVERY 6 HOURS PRN
Status: DISCONTINUED | OUTPATIENT
Start: 2025-03-26 | End: 2025-03-28 | Stop reason: HOSPADM

## 2025-03-26 RX ORDER — BUPIVACAINE HYDROCHLORIDE 5 MG/ML
6 INJECTION, SOLUTION EPIDURAL; INTRACAUDAL; PERINEURAL ONCE
Status: DISCONTINUED | OUTPATIENT
Start: 2025-03-26 | End: 2025-03-28 | Stop reason: HOSPADM

## 2025-03-26 RX ORDER — LIDOCAINE HYDROCHLORIDE 10 MG/ML
10 INJECTION, SOLUTION EPIDURAL; INFILTRATION; INTRACAUDAL; PERINEURAL ONCE
Status: DISCONTINUED | OUTPATIENT
Start: 2025-03-26 | End: 2025-03-28 | Stop reason: HOSPADM

## 2025-03-26 RX ADMIN — DIAZEPAM 2 MG: 2 TABLET ORAL at 13:54

## 2025-03-26 ASSESSMENT — PAIN SCALES - GENERAL
PAINLEVEL_OUTOF10: 5
PAINLEVEL_OUTOF10: 5

## 2025-03-26 ASSESSMENT — PAIN DESCRIPTION - LOCATION
LOCATION: BACK
LOCATION: BACK

## 2025-03-26 NOTE — PROGRESS NOTES
Procedure:  Level of Consciousness: [x]Alert [x]Oriented []Disoriented []Lethargic  Anxiety Level: [x]Calm []Anxious []Depressed []Other  Skin: [x]Warm [x]Dry []Cool []Moist []Intact []Other  Cardiovascular: [x]Palpitations: []Never [x]Occasionally []Frequently  Chest Pain: [x]No []Yes  Respiratory:  [x]Unlabored []Labored []Cough ([] Productive []Unproductive)  HCG Required: [x]No []Yes   Results: []Negative []Positive  Knowledge Level:        [x]Patient/Other verbalized understanding of pre-procedure instructions.        [x]Assessment of post-op care needs (transportation, responsible caregiver)        [x]Able to discuss health care problems and how to deal with it.  Factors that Affect Teaching:        Language Barrier: [x]No []Yes - why:        Hearing Loss:        [x]No []Yes            Corrective Device:  []Yes []No        Vision Loss:           []No [x]Yes            Corrective Device:  [x]Yes []No        Memory Loss:       [x]No []Yes            []Short Term []Long Term  Motivational Level:  [x]Asks Questions                  []Extremely Anxious       [x]Seems Interested               []Seems Uninterested                  []Denies need for Education  Risk for Injury:  [x]Patient oriented to person, place and time  []History of frequent falls/loss of balance  Nutritional:  []Change in appetite   []Weight Gain   []Weight Loss  Functional:  []Requires assistance with ADL's

## 2025-03-26 NOTE — PROCEDURES
The patient's History and Physical  was reviewed with the patient and I examined the patient. There was no change. The surgical site was confirmed by the patient and me.            Plan: The risks, benefits, expected outcome, and alternative to the recommended procedure have been discussed with the patient. Patient understands and wants to proceed with the procedure.      -------------------------------------------------------------------------------------  Diagnosis  Lumbar Facet Pain  Procedures  Bilateral L3-L5 Lumbar Medial Branch Block    Procedure Summary  Indications: Ashley is an 89 y.o. female who is having an injection for lumbar facet pain.    CONSENT: Risks, benefits and options were explained to the patient, all questions were answered and written informed consent was obtained.      PROCEDURE NOTE:  A pre-procedural time out was performed to confirm the correct patient, procedure, side, and site. A sterile field was prepped in standard fashion using Chlorhexidine and draped with sterile towels. The selected medial branch nerves were identified using an ipsilateral oblique fluoroscopic view. The overlying skin and subcutaneous tissue was anesthetized with 1% lidocaine. A 22 gauge 3.5 inch spinal needle was advanced using intermittent fluoroscopy toward the junction of the transverse process and superior articulating process targeting bilateral L3-L5 medial branch nerves. Needle placement was confirmed with biplanar fluoroscopic imaging. The L5 dorsal ramus was targeted at the sacral ala. Following negative aspiration, 0.5 mL of .5% bupivicaine was injected at each location. The needles were re-styletted and withdrawn. The patient's skin was cleaned with alcohol and the injection sites covered with bandages.    EBL: None  COMPLICATIONS: None    The patient was monitored for at least 30 minutes prior to discharge. Vital signs remained stable throughout the procedure and in the recovery area. There were no

## 2025-03-26 NOTE — DISCHARGE INSTRUCTIONS
[facility] Physical And Pain Medicine  Post Procedure Discharge Instructions        YOU HAVE HAD THE FOLLOWING PROCEDURE:                                  [] Occipital Nerve Blocks  [] CTS wrist injection(s)  [] Knee Injection(s)         [] Shoulder Injection(s)   [] Elbow Injection(s)     [] Botox Injection  [] Cervical Trigger Point Injections    [] Thoracic Trigger Point Injections    [] Lumbar Trigger Point Injections  [] Piriformis Trigger Point Injections  [] SI Joint Injection(s)     [] Trochanteric Bursa Injection(s)       [] Ankle Injection(s)   [] Plantar Fasciitis   [x]  _____MBB #1_________  Injection(s) [] Botox []  Migraines [] Spasticity    YOU HAVE RECEIVED THE FOLLOWING MEDICATIONS IN YOUR INJECTION(s)  [] Lidocaine [] Bupivacaine   [] DepoMedrol (steroid) [] Decadron (steroid)  []  Kenalog (steroid)   [] Toradol  [] Supartz [] Zilretta    [] Botox        PATIENT INFORMATION:   You may experience the following symptoms after your procedure. These symptoms are normal and should not cause concern:    You may have an increase in your pain. This may last 24 - 48 hours after your procedure.  You may have no change in the pain that you had prior to your injection(s).  You may have weakness or numbness in your affected extremity. If this occurs, this may last until numbing the medication wears off.     REPORT THE FOLLOWING SYMPTOMS TO YOUR DOCTOR:  Redness, swelling or drainage at the injection site(s)  Unusual pain that interferes with your normal activities of daily living.    OTHER INSTRUCTIONS:    [x] I will apply ice to the injection site(s) for at least 24 hours after the procedure. I will rotate the ice on for 20 minutes and off for 20 minutes for at least 24 hours.    [] I will not apply heat for at least 48 hours and I will not take a hot bath or shower for at least 24 hours.     [x] I understand that if Lidocaine or Bupivacaine was used in my injection(s) that the injection

## 2025-03-27 ENCOUNTER — TELEPHONE (OUTPATIENT)
Dept: PAIN MANAGEMENT | Age: 89
End: 2025-03-27

## 2025-03-27 NOTE — TELEPHONE ENCOUNTER
Placed follow up call regarding her MBB #1 L3-5 administered on 3/26/25.  No answer.  No voicemail available to leave message at this time.  Electronically signed by Dannielle Adam RN on 3/27/2025 at 2:19 PM

## 2025-04-21 ENCOUNTER — TELEPHONE (OUTPATIENT)
Dept: PAIN MANAGEMENT | Age: 89
End: 2025-04-21

## 2025-04-21 NOTE — TELEPHONE ENCOUNTER
How much did the shot help?        90    %   For Median Branch Blocks-    Duration of shot?   2 weeks           What is your current pain level now?  2/10 , currently a 7/10 but was almost pain free right after the procedure.                       Has your activity level increased since the shot?   Yes   Electronically signed by Ignacio Funes RN on 4/21/2025 at 3:24 PM

## 2025-04-23 DIAGNOSIS — M47.816 FACET HYPERTROPHY OF LUMBAR REGION: ICD-10-CM

## 2025-04-23 RX ORDER — HYDROCODONE BITARTRATE AND ACETAMINOPHEN 7.5; 325 MG/1; MG/1
1 TABLET ORAL EVERY 6 HOURS PRN
Qty: 120 TABLET | Refills: 0 | Status: SHIPPED | OUTPATIENT
Start: 2025-04-23 | End: 2025-05-23

## 2025-05-21 ENCOUNTER — TELEPHONE (OUTPATIENT)
Dept: PAIN MANAGEMENT | Age: 89
End: 2025-05-21

## 2025-05-21 NOTE — TELEPHONE ENCOUNTER
Call placed to patient to remind her to hold her asa x5 days starting tomorrow for lumbar facet injection next Tuesday 5/27/25. Patient's daughter, Marlen, states understanding.

## 2025-05-27 ENCOUNTER — HOSPITAL ENCOUNTER (OUTPATIENT)
Dept: PAIN MANAGEMENT | Age: 89
Discharge: HOME | End: 2025-05-27
Payer: MEDICARE

## 2025-05-27 VITALS
DIASTOLIC BLOOD PRESSURE: 80 MMHG | HEART RATE: 47 BPM | OXYGEN SATURATION: 99 % | RESPIRATION RATE: 16 BRPM | SYSTOLIC BLOOD PRESSURE: 152 MMHG | TEMPERATURE: 97.4 F

## 2025-05-27 DIAGNOSIS — R52 PAIN MANAGEMENT: ICD-10-CM

## 2025-05-27 PROCEDURE — 64494 INJ PARAVERT F JNT L/S 2 LEV: CPT

## 2025-05-27 PROCEDURE — 64493 INJ PARAVERT F JNT L/S 1 LEV: CPT

## 2025-05-27 PROCEDURE — 6360000002 HC RX W HCPCS

## 2025-05-27 RX ORDER — BUPIVACAINE HYDROCHLORIDE 5 MG/ML
5 INJECTION, SOLUTION EPIDURAL; INTRACAUDAL; PERINEURAL ONCE
Status: DISCONTINUED | OUTPATIENT
Start: 2025-05-27 | End: 2025-05-29 | Stop reason: HOSPADM

## 2025-05-27 RX ORDER — LIDOCAINE HYDROCHLORIDE 10 MG/ML
20 INJECTION, SOLUTION EPIDURAL; INFILTRATION; INTRACAUDAL; PERINEURAL ONCE
Status: DISCONTINUED | OUTPATIENT
Start: 2025-05-27 | End: 2025-05-29 | Stop reason: HOSPADM

## 2025-05-27 RX ORDER — TRIAMCINOLONE ACETONIDE 40 MG/ML
80 INJECTION, SUSPENSION INTRA-ARTICULAR; INTRAMUSCULAR ONCE
Status: DISCONTINUED | OUTPATIENT
Start: 2025-05-27 | End: 2025-05-29 | Stop reason: HOSPADM

## 2025-05-27 NOTE — INTERVAL H&P NOTE
Update History & Physical    The patient's History and Physical  was reviewed with the patient and I examined the patient. There was no change. The surgical site was confirmed by the patient and me.     Plan: The risks, benefits, expected outcome, and alternative to the recommended procedure have been discussed with the patient. Patient understands and wants to proceed with the procedure.     Electronically signed by Renaldo Johnston MD on 5/27/2025 at 9:56 AM

## 2025-05-27 NOTE — PROGRESS NOTES
Procedure:  Level of Consciousness: [x]Alert [x]Oriented []Disoriented []Lethargic  Anxiety Level: [x]Calm []Anxious []Depressed []Other  Skin: [x]Warm []Dry []Cool []Moist []Intact []Other  Cardiovascular: []Palpitations: [x]Never []Occasionally []Frequently  Chest Pain: [x]No []Yes  Respiratory:  [x]Unlabored []Labored []Cough ([] Productive []Unproductive)  HCG Required: [x]No []Yes   Results: []Negative []Positive  Knowledge Level:        [x]Patient/Other verbalized understanding of pre-procedure instructions.        [x]Assessment of post-op care needs (transportation, responsible caregiver)        [x]Able to discuss health care problems and how to deal with it.  Factors that Affect Teaching:        Language Barrier: [x]No []Yes - why:        Hearing Loss:        [x]No []Yes            Corrective Device:  []Yes []No        Vision Loss:           [x]No []Yes            Corrective Device:  []Yes []No        Memory Loss:       [x]No []Yes            []Short Term []Long Term  Motivational Level:  [x]Asks Questions                  []Extremely Anxious       [x]Seems Interested               []Seems Uninterested                  []Denies need for Education  Risk for Injury:  [x]Patient oriented to person, place and time  []History of frequent falls/loss of balance  Nutritional:  []Change in appetite   []Weight Gain   []Weight Loss  Functional:  []Requires assistance with ADL's

## 2025-06-02 ENCOUNTER — TELEPHONE (OUTPATIENT)
Dept: PAIN MANAGEMENT | Age: 89
End: 2025-06-02

## 2025-06-02 NOTE — TELEPHONE ENCOUNTER
How much did the shot help?       90     %   What is your current pain level now?  1/10                       Has your activity level increased since the shot?   Yes  Electronically signed by aDnnielle Adam RN on 6/2/2025 at 9:47 AM

## 2025-06-18 ENCOUNTER — OFFICE VISIT (OUTPATIENT)
Dept: PAIN MANAGEMENT | Age: 89
End: 2025-06-18
Payer: MEDICARE

## 2025-06-18 VITALS
DIASTOLIC BLOOD PRESSURE: 90 MMHG | WEIGHT: 138 LBS | HEART RATE: 49 BPM | SYSTOLIC BLOOD PRESSURE: 140 MMHG | TEMPERATURE: 96.2 F | BODY MASS INDEX: 23.56 KG/M2 | HEIGHT: 64 IN | OXYGEN SATURATION: 95 % | RESPIRATION RATE: 16 BRPM

## 2025-06-18 DIAGNOSIS — M47.816 LUMBAR FACET ARTHROPATHY: ICD-10-CM

## 2025-06-18 DIAGNOSIS — G89.29 CHRONIC BILATERAL LOW BACK PAIN WITHOUT SCIATICA: Primary | ICD-10-CM

## 2025-06-18 DIAGNOSIS — M54.50 CHRONIC BILATERAL LOW BACK PAIN WITHOUT SCIATICA: Primary | ICD-10-CM

## 2025-06-18 DIAGNOSIS — M53.3 SACROILIAC JOINT DYSFUNCTION OF RIGHT SIDE: ICD-10-CM

## 2025-06-18 DIAGNOSIS — F11.90 CHRONIC, CONTINUOUS USE OF OPIOIDS: ICD-10-CM

## 2025-06-18 PROCEDURE — 1160F RVW MEDS BY RX/DR IN RCRD: CPT

## 2025-06-18 PROCEDURE — G8427 DOCREV CUR MEDS BY ELIG CLIN: HCPCS

## 2025-06-18 PROCEDURE — 1036F TOBACCO NON-USER: CPT

## 2025-06-18 PROCEDURE — 1123F ACP DISCUSS/DSCN MKR DOCD: CPT

## 2025-06-18 PROCEDURE — 99214 OFFICE O/P EST MOD 30 MIN: CPT

## 2025-06-18 PROCEDURE — 1090F PRES/ABSN URINE INCON ASSESS: CPT

## 2025-06-18 PROCEDURE — 1159F MED LIST DOCD IN RCRD: CPT

## 2025-06-18 PROCEDURE — G8420 CALC BMI NORM PARAMETERS: HCPCS

## 2025-06-18 RX ORDER — HYDROCODONE BITARTRATE AND ACETAMINOPHEN 7.5; 325 MG/1; MG/1
1 TABLET ORAL 2 TIMES DAILY PRN
Qty: 60 TABLET | Refills: 0 | Status: SHIPPED | OUTPATIENT
Start: 2025-06-18 | End: 2025-07-18

## 2025-06-18 RX ORDER — HYDROCODONE BITARTRATE AND ACETAMINOPHEN 7.5; 325 MG/1; MG/1
1 TABLET ORAL 2 TIMES DAILY PRN
Qty: 60 TABLET | Refills: 0 | Status: SHIPPED | OUTPATIENT
Start: 2025-06-18 | End: 2025-06-18

## 2025-06-18 RX ORDER — SUCRALFATE 1 G/1
TABLET ORAL
COMMUNITY
Start: 2025-04-02

## 2025-06-18 RX ORDER — DIPHENOXYLATE HYDROCHLORIDE AND ATROPINE SULFATE 2.5; .025 MG/1; MG/1
TABLET ORAL
COMMUNITY
Start: 2025-05-27

## 2025-06-18 RX ORDER — BUPRENORPHINE 7.5 UG/H
1 PATCH TRANSDERMAL WEEKLY
Qty: 4 PATCH | Refills: 0 | Status: SHIPPED | OUTPATIENT
Start: 2025-06-18 | End: 2025-07-16

## 2025-06-18 NOTE — PROGRESS NOTES
[facility]  [address]  [place] [address]  Phone: [phone]  Fax: [phone]        Annual History and Physical    Patient Name: Ashley Gutierrez    MR #: [id number]    Account #:    : 1935    Age: 89 y.o.    Sex: female    Date: 2025    PCP: Grover Wright MD    Chief Complaint:   Chief Complaint   Patient presents with    Back Pain     4/10       History of Present Illness:   The patient is a 89 y.o. female who presents for annual H&P with primary complaints of chronic back pain. They have been an established patient since 2019. Last visit we addressed dizziness and drowsiness, she states she has stopped one of her benzos. Reports she is still sleepy a lot.     Patient had bilateral lumbar MBB L3, 4, 5 on 3/26/25. Patient had at least 80-85% relief of pain from procedure(s) for at least 12 hours. After injection, patient was able to increase activity. Patient had less pain from aggravating factors.     Patient had bilateral lumbar MBB L3, 4, 5 on 25. Patient had at least 80-85% relief of pain from procedure(s) for at least 6 hours. After injection, patient was able to increase activity. Patient had less pain from aggravating factors. She reports she would like to proceed with RFA.    Patient is currently managed on norco and diclofenac. Patient takes opioids on a schedule 4 times daily. With pain medication patient is able to tolerate activities longer than without. Patient reports side effects of constipation and drowsiness from their opioid therapy. Patient reports this therapy is effective.     Current MME: 30 MME / Day    Back Pain  This is a chronic problem. The current episode started more than 1 year ago. The problem occurs constantly. The problem has been gradually worsening since onset. The pain is present in the lumbar spine. The quality of the pain is described as aching, burning, shooting and stabbing. The pain radiates to the

## 2025-06-18 NOTE — PATIENT INSTRUCTIONS
- Rotate sites for patch- backs of arms, lower abdomen or low back at buttocks  - Apply after showering. Wipe intended site with alcohol wipe and allow to dry before placing  - discard previous patch before applying new one  - give one week to see if it will be effective.   - do not place any tape or other adhesives over top of the patch     WEANING XANAX- take half tablet in AM and 1 whole tablet at bedtime.  If doing okay for a month, begin half tablet twice daily.  If doing well after another month, may drop to just half tablet at bedtime.

## 2025-06-24 ENCOUNTER — TELEPHONE (OUTPATIENT)
Dept: PAIN MANAGEMENT | Age: 89
End: 2025-06-24

## 2025-06-24 NOTE — TELEPHONE ENCOUNTER
PA for Butrans was sent over from pharmacy with insurance attached, that insurance and the card on the patients chart both said no coverage.  I will sent the pt a [email or web address] message

## 2025-07-22 ASSESSMENT — ENCOUNTER SYMPTOMS
RESPIRATORY NEGATIVE: 1
BOWEL INCONTINENCE: 0
BACK PAIN: 1
EYES NEGATIVE: 1
GASTROINTESTINAL NEGATIVE: 1

## 2025-07-22 NOTE — ASSESSMENT & PLAN NOTE
- KHALIDA reviewed, drug screens reviewed, visits appropriate. Pain contract up to date.     Will continue to strive to explore alternatives to chronic opioid use in this patient with non-malignant pain. Counseling provided in regards to the risks of opioid medications including risk of tolerance, withdrawals and addiction. Furthermore, these medications are known to cause immunosuppression, respiratory depression and even early death.     The CDC guideline is aimed at improving opioid prescribing practices to ensure safe and effective chronic pain treatment while reducing the risk of opioid use disorder (OUD), overdose, and death. Although the CDC advises against high doses of opioid pain medication therapy, it may be medically risky to abruptly discontinue opioids and other controlled substances in a patient who is physically dependent without psychosocial management, focused care, gradual taper, and/or adjuvant therapies to treat withdrawal symptoms. Patients must also be willing to taper and pursue alternative medications and treatments.     This patient is high risk on opioid medications. Patient is willing to wean if alternative methods of pain control are effective.

## 2025-07-22 NOTE — ASSESSMENT & PLAN NOTE
- patient remains drowsy on her current medications. She is willing to continue weaning her benzos. She is also willing to trial butrans to aid in reduction of side effects.   - Decrease norco to 3 times daily x 7 days  - then take norco twice daily x 7 days  - then take norco only as needed  - START Butrans Patch  - take weekly as directed  - increase water intake to prevent constipation  - report any side effects to the office  Patient educated on how to properly apply patch and voiced understanding importance to rotate placement sites. Patient voiced understanding not to place tape or adhesive over patch. The drug task force recommends the use of buprenorphine for chronic pain due to ceiling limit on respiratory depression and decreased euphoric effect as other opioids. It would be medically negligent to prescribe a scheduled II long acting opioid for chronic pain with a clinically proven schedule III long acting opioid is available.

## 2025-07-22 NOTE — ASSESSMENT & PLAN NOTE
- continue diclofenac (Cataflam, Voltaren)  - take with food to avoid stomach upset  - report any abdominal pain, bloody stools or bloody vomit immediately  - increase water intake while taking  - do not take other NSAIDs while taking - naproxen, ibuprofen, aspirin  - may take plain tylenol or tylenol arthritis while taking this medication

## 2025-07-30 ENCOUNTER — OFFICE VISIT (OUTPATIENT)
Dept: PAIN MANAGEMENT | Age: 89
End: 2025-07-30
Payer: MEDICARE

## 2025-07-30 VITALS
SYSTOLIC BLOOD PRESSURE: 167 MMHG | TEMPERATURE: 96.4 F | HEIGHT: 64 IN | OXYGEN SATURATION: 97 % | DIASTOLIC BLOOD PRESSURE: 83 MMHG | BODY MASS INDEX: 23.56 KG/M2 | WEIGHT: 138 LBS | HEART RATE: 1 BPM

## 2025-07-30 DIAGNOSIS — Z79.899 POLYPHARMACY: ICD-10-CM

## 2025-07-30 DIAGNOSIS — M53.3 SACROILIAC JOINT DYSFUNCTION OF RIGHT SIDE: ICD-10-CM

## 2025-07-30 DIAGNOSIS — F11.90 CHRONIC, CONTINUOUS USE OF OPIOIDS: ICD-10-CM

## 2025-07-30 DIAGNOSIS — R00.1 BRADYCARDIA: ICD-10-CM

## 2025-07-30 DIAGNOSIS — M54.50 CHRONIC BILATERAL LOW BACK PAIN WITHOUT SCIATICA: Primary | ICD-10-CM

## 2025-07-30 DIAGNOSIS — R42 DIZZINESS: ICD-10-CM

## 2025-07-30 DIAGNOSIS — M47.816 LUMBAR FACET ARTHROPATHY: ICD-10-CM

## 2025-07-30 DIAGNOSIS — G89.29 CHRONIC BILATERAL LOW BACK PAIN WITHOUT SCIATICA: Primary | ICD-10-CM

## 2025-07-30 PROCEDURE — 1090F PRES/ABSN URINE INCON ASSESS: CPT

## 2025-07-30 PROCEDURE — G8420 CALC BMI NORM PARAMETERS: HCPCS

## 2025-07-30 PROCEDURE — 99214 OFFICE O/P EST MOD 30 MIN: CPT

## 2025-07-30 PROCEDURE — G8427 DOCREV CUR MEDS BY ELIG CLIN: HCPCS

## 2025-07-30 PROCEDURE — 1160F RVW MEDS BY RX/DR IN RCRD: CPT

## 2025-07-30 PROCEDURE — 1123F ACP DISCUSS/DSCN MKR DOCD: CPT

## 2025-07-30 PROCEDURE — 1036F TOBACCO NON-USER: CPT

## 2025-07-30 PROCEDURE — 1159F MED LIST DOCD IN RCRD: CPT

## 2025-07-30 RX ORDER — HYDROCODONE BITARTRATE AND ACETAMINOPHEN 7.5; 325 MG/1; MG/1
1 TABLET ORAL EVERY 6 HOURS PRN
Qty: 120 TABLET | Refills: 0 | Status: SHIPPED | OUTPATIENT
Start: 2025-07-30 | End: 2025-08-29

## 2025-07-30 ASSESSMENT — PATIENT HEALTH QUESTIONNAIRE - PHQ9
1. LITTLE INTEREST OR PLEASURE IN DOING THINGS: NEARLY EVERY DAY
SUM OF ALL RESPONSES TO PHQ QUESTIONS 1-9: 10
6. FEELING BAD ABOUT YOURSELF - OR THAT YOU ARE A FAILURE OR HAVE LET YOURSELF OR YOUR FAMILY DOWN: NOT AT ALL
SUM OF ALL RESPONSES TO PHQ QUESTIONS 1-9: 10
SUM OF ALL RESPONSES TO PHQ QUESTIONS 1-9: 10
3. TROUBLE FALLING OR STAYING ASLEEP: NEARLY EVERY DAY
5. POOR APPETITE OR OVEREATING: NOT AT ALL
10. IF YOU CHECKED OFF ANY PROBLEMS, HOW DIFFICULT HAVE THESE PROBLEMS MADE IT FOR YOU TO DO YOUR WORK, TAKE CARE OF THINGS AT HOME, OR GET ALONG WITH OTHER PEOPLE: SOMEWHAT DIFFICULT
7. TROUBLE CONCENTRATING ON THINGS, SUCH AS READING THE NEWSPAPER OR WATCHING TELEVISION: SEVERAL DAYS
8. MOVING OR SPEAKING SO SLOWLY THAT OTHER PEOPLE COULD HAVE NOTICED. OR THE OPPOSITE, BEING SO FIGETY OR RESTLESS THAT YOU HAVE BEEN MOVING AROUND A LOT MORE THAN USUAL: NOT AT ALL
2. FEELING DOWN, DEPRESSED OR HOPELESS: NOT AT ALL
9. THOUGHTS THAT YOU WOULD BE BETTER OFF DEAD, OR OF HURTING YOURSELF: NOT AT ALL
4. FEELING TIRED OR HAVING LITTLE ENERGY: NEARLY EVERY DAY
SUM OF ALL RESPONSES TO PHQ QUESTIONS 1-9: 10

## 2025-07-30 ASSESSMENT — ENCOUNTER SYMPTOMS
RESPIRATORY NEGATIVE: 1
EYES NEGATIVE: 1
GASTROINTESTINAL NEGATIVE: 1
BACK PAIN: 1
BOWEL INCONTINENCE: 0

## 2025-07-30 NOTE — ASSESSMENT & PLAN NOTE
- May continue norco up to 4 times daily until butrans approved  - START Butrans Patch  - take weekly as directed  - ONCE BUTRANS STARTED, TAKE NORCO ONLY 2 TIMES DAILY  - increase water intake to prevent constipation  - report any side effects to the office  Patient educated on how to properly apply patch and voiced understanding importance to rotate placement sites. Patient voiced understanding not to place tape or adhesive over patch. The drug task force recommends the use of buprenorphine for chronic pain due to ceiling limit on respiratory depression and decreased euphoric effect as other opioids. It would be medically negligent to prescribe a scheduled II long acting opioid for chronic pain with a clinically proven schedule III long acting opioid is available.

## 2025-07-30 NOTE — PROGRESS NOTES
[facility]  [address]  [place] [address]  Phone: [phone]  Fax: [phone]        Follow Up Office Visit    Patient Name: Ashley Gutierrez    MR #: [id number]    Account #:    : 1935    Age: 89 y.o.    Sex: female    Date: 2025     PCP: Grover Wright MD    Chief Complaint:   Chief Complaint   Patient presents with    Back Pain     3/10       History of Present Illness:   The patient is a 89 y.o. female who presents for follow up on primary complaints of chronic back pain. Patient has been an established patient since 2019. She is here to follow up on medication- butrans. She reports she was unable to get the butrans patches due to insurance issue.    She has stopped her xanax and decreased her pain medication. Despite all of this, had a fall forward onto her face and broke her glasses. She has been referred to cardiology since last visit due to dizziness and chest pain. Wore holter monitor and is likely going to receive a pacemaker.    Patient had bilateral lumbar facet injections L3-4, L4-5 on 25. Patient had at least 80-85% relief of pain from procedure(s) for at least 8 weeks. After injection, patient was able to increase activity. Patient had less pain from aggravating factors. Patient was able to decrease pain medication usage. Patient received enough pain relief from injections that the patient would like to repeat the injection(s).     Patient has changed her mind about proceeding with RFA  due to cardiac issues and likely upcoming pacemaker placement. She would like to continue facet injections due to history of good relief with them.     Patient is currently managed on norco and diclofenac. Patient takes opioids on a schedule 2 times daily. With pain medication patient is able to tolerate activities longer than without. Patient denies any side effects from their opioid medication. Patient reports this therapy is not effective.

## 2025-07-30 NOTE — ASSESSMENT & PLAN NOTE
- Likely due to cardiac rate due to reduction of polypharmacy and no improvement  - patient is a fall risk due to neuropathy, sedative medications, age and multiple co-morbidities.   - Family to mitigate risk for falls within the home by removing rugs and other tripping hazards, ensuring adequate lighting for night time ambulation and nonslip bath mats.  - patient is to mitigate risk of falls by wearing non slip shoes when ambulating, avoid ambulation in socks with  on the soles, reporting  any side effects of medications, using handrails when available and use of assistive devices for ambulation and transfers to reduce risk of falls.   - Other helpful fall prevention tips: install grab bars in bathroom and shower, a raised toilet seat, non slip tread added to wood steps, decluttering the home and remove furniture in high traffic areas  benefits of sedative medications outweigh risks at this time.

## 2025-07-30 NOTE — ASSESSMENT & PLAN NOTE
- patient is on high dose opioids  - patient has successfully weaned more than one controlled substance  - monitor renal function and adjust accordingly  - patient is at risk for over-sedation, falls and renal injury

## 2025-07-31 ENCOUNTER — TELEPHONE (OUTPATIENT)
Dept: PAIN MANAGEMENT | Age: 89
End: 2025-07-31

## 2025-07-31 DIAGNOSIS — G89.29 CHRONIC BILATERAL LOW BACK PAIN WITHOUT SCIATICA: ICD-10-CM

## 2025-07-31 DIAGNOSIS — M54.50 CHRONIC BILATERAL LOW BACK PAIN WITHOUT SCIATICA: ICD-10-CM

## 2025-07-31 RX ORDER — BUPRENORPHINE 7.5 UG/H
1 PATCH TRANSDERMAL WEEKLY
Qty: 4 PATCH | Refills: 0 | Status: SHIPPED | OUTPATIENT
Start: 2025-07-31 | End: 2025-08-28

## 2025-07-31 RX ORDER — BUPRENORPHINE 7.5 UG/H
1 PATCH TRANSDERMAL WEEKLY
Qty: 4 PATCH | Refills: 0 | Status: SHIPPED | OUTPATIENT
Start: 2025-07-31 | End: 2025-07-31 | Stop reason: SDUPTHER

## 2025-07-31 NOTE — TELEPHONE ENCOUNTER
PA is approved for Butrans patch, [facility] will not fill since they are losing over 100.00 to fill it.  I spoke to the patient's daughter and she requested [facility] [place], [place]